# Patient Record
Sex: MALE | Race: WHITE | Employment: FULL TIME | URBAN - METROPOLITAN AREA
[De-identification: names, ages, dates, MRNs, and addresses within clinical notes are randomized per-mention and may not be internally consistent; named-entity substitution may affect disease eponyms.]

---

## 2018-03-01 ENCOUNTER — APPOINTMENT (OUTPATIENT)
Dept: MRI IMAGING | Age: 68
DRG: 069 | End: 2018-03-01
Attending: INTERNAL MEDICINE
Payer: COMMERCIAL

## 2018-03-01 ENCOUNTER — HOSPITAL ENCOUNTER (OUTPATIENT)
Age: 68
Setting detail: OBSERVATION
Discharge: HOME | DRG: 069 | End: 2018-03-03
Attending: EMERGENCY MEDICINE | Admitting: INTERNAL MEDICINE
Payer: COMMERCIAL

## 2018-03-01 ENCOUNTER — APPOINTMENT (OUTPATIENT)
Dept: CT IMAGING | Age: 68
DRG: 069 | End: 2018-03-01
Attending: EMERGENCY MEDICINE
Payer: COMMERCIAL

## 2018-03-01 DIAGNOSIS — G45.9 TRANSIENT CEREBRAL ISCHEMIA, UNSPECIFIED TYPE: Primary | ICD-10-CM

## 2018-03-01 DIAGNOSIS — I16.0 MALIGNANT HYPERTENSIVE URGENCY: ICD-10-CM

## 2018-03-01 PROBLEM — I16.1 HYPERTENSIVE EMERGENCY: Status: ACTIVE | Noted: 2018-03-01

## 2018-03-01 LAB
ALBUMIN SERPL-MCNC: 4.5 G/DL (ref 3.2–4.6)
ALBUMIN/GLOB SERPL: 1.3 {RATIO} (ref 1.2–3.5)
ALP SERPL-CCNC: 116 U/L (ref 50–136)
ALT SERPL-CCNC: 19 U/L (ref 12–65)
ANION GAP SERPL CALC-SCNC: 6 MMOL/L (ref 7–16)
AST SERPL-CCNC: 24 U/L (ref 15–37)
ATRIAL RATE: 71 BPM
BASOPHILS # BLD: 0 K/UL (ref 0–0.2)
BASOPHILS NFR BLD: 1 % (ref 0–2)
BILIRUB SERPL-MCNC: 0.7 MG/DL (ref 0.2–1.1)
BUN SERPL-MCNC: 18 MG/DL (ref 8–23)
CALCIUM SERPL-MCNC: 9.1 MG/DL (ref 8.3–10.4)
CALCULATED P AXIS, ECG09: 66 DEGREES
CALCULATED R AXIS, ECG10: 44 DEGREES
CALCULATED T AXIS, ECG11: 62 DEGREES
CHLORIDE SERPL-SCNC: 101 MMOL/L (ref 98–107)
CO2 SERPL-SCNC: 33 MMOL/L (ref 21–32)
CREAT SERPL-MCNC: 1.24 MG/DL (ref 0.8–1.5)
DIAGNOSIS, 93000: NORMAL
DIFFERENTIAL METHOD BLD: ABNORMAL
EOSINOPHIL # BLD: 0.2 K/UL (ref 0–0.8)
EOSINOPHIL NFR BLD: 3 % (ref 0.5–7.8)
ERYTHROCYTE [DISTWIDTH] IN BLOOD BY AUTOMATED COUNT: 13.3 % (ref 11.9–14.6)
GLOBULIN SER CALC-MCNC: 3.4 G/DL (ref 2.3–3.5)
GLUCOSE BLD STRIP.AUTO-MCNC: 128 MG/DL (ref 65–100)
GLUCOSE SERPL-MCNC: 102 MG/DL (ref 65–100)
HCT VFR BLD AUTO: 48.1 % (ref 41.1–50.3)
HGB BLD-MCNC: 17.3 G/DL (ref 13.6–17.2)
IMM GRANULOCYTES # BLD: 0 K/UL (ref 0–0.5)
IMM GRANULOCYTES NFR BLD AUTO: 0 % (ref 0–5)
INR BLD: 1 (ref 0.9–1.2)
LYMPHOCYTES # BLD: 1.7 K/UL (ref 0.5–4.6)
LYMPHOCYTES NFR BLD: 26 % (ref 13–44)
MCH RBC QN AUTO: 31.6 PG (ref 26.1–32.9)
MCHC RBC AUTO-ENTMCNC: 36 G/DL (ref 31.4–35)
MCV RBC AUTO: 87.9 FL (ref 79.6–97.8)
MONOCYTES # BLD: 0.8 K/UL (ref 0.1–1.3)
MONOCYTES NFR BLD: 12 % (ref 4–12)
NEUTS SEG # BLD: 3.7 K/UL (ref 1.7–8.2)
NEUTS SEG NFR BLD: 58 % (ref 43–78)
P-R INTERVAL, ECG05: 190 MS
PLATELET # BLD AUTO: 208 K/UL (ref 150–450)
PMV BLD AUTO: 9.9 FL (ref 10.8–14.1)
POTASSIUM SERPL-SCNC: 3.6 MMOL/L (ref 3.5–5.1)
PROT SERPL-MCNC: 7.9 G/DL (ref 6.3–8.2)
PT BLD: 12.3 SECS (ref 9.6–11.6)
Q-T INTERVAL, ECG07: 394 MS
QRS DURATION, ECG06: 96 MS
QTC CALCULATION (BEZET), ECG08: 428 MS
RBC # BLD AUTO: 5.47 M/UL (ref 4.23–5.67)
SODIUM SERPL-SCNC: 140 MMOL/L (ref 136–145)
VENTRICULAR RATE, ECG03: 71 BPM
WBC # BLD AUTO: 6.4 K/UL (ref 4.3–11.1)

## 2018-03-01 PROCEDURE — 96366 THER/PROPH/DIAG IV INF ADDON: CPT

## 2018-03-01 PROCEDURE — 80053 COMPREHEN METABOLIC PANEL: CPT | Performed by: EMERGENCY MEDICINE

## 2018-03-01 PROCEDURE — 74011250636 HC RX REV CODE- 250/636: Performed by: EMERGENCY MEDICINE

## 2018-03-01 PROCEDURE — 85610 PROTHROMBIN TIME: CPT

## 2018-03-01 PROCEDURE — 74011636320 HC RX REV CODE- 636/320: Performed by: EMERGENCY MEDICINE

## 2018-03-01 PROCEDURE — 65610000001 HC ROOM ICU GENERAL

## 2018-03-01 PROCEDURE — 82962 GLUCOSE BLOOD TEST: CPT

## 2018-03-01 PROCEDURE — 96365 THER/PROPH/DIAG IV INF INIT: CPT | Performed by: EMERGENCY MEDICINE

## 2018-03-01 PROCEDURE — 99218 HC RM OBSERVATION: CPT

## 2018-03-01 PROCEDURE — 70498 CT ANGIOGRAPHY NECK: CPT

## 2018-03-01 PROCEDURE — 93005 ELECTROCARDIOGRAM TRACING: CPT | Performed by: EMERGENCY MEDICINE

## 2018-03-01 PROCEDURE — 74011000250 HC RX REV CODE- 250: Performed by: INTERNAL MEDICINE

## 2018-03-01 PROCEDURE — 74011000258 HC RX REV CODE- 258: Performed by: EMERGENCY MEDICINE

## 2018-03-01 PROCEDURE — 99285 EMERGENCY DEPT VISIT HI MDM: CPT | Performed by: EMERGENCY MEDICINE

## 2018-03-01 PROCEDURE — 85025 COMPLETE CBC W/AUTO DIFF WBC: CPT | Performed by: EMERGENCY MEDICINE

## 2018-03-01 PROCEDURE — 74011000258 HC RX REV CODE- 258: Performed by: INTERNAL MEDICINE

## 2018-03-01 PROCEDURE — 74011250637 HC RX REV CODE- 250/637: Performed by: EMERGENCY MEDICINE

## 2018-03-01 PROCEDURE — 70551 MRI BRAIN STEM W/O DYE: CPT

## 2018-03-01 PROCEDURE — 74011250637 HC RX REV CODE- 250/637: Performed by: INTERNAL MEDICINE

## 2018-03-01 PROCEDURE — 70450 CT HEAD/BRAIN W/O DYE: CPT

## 2018-03-01 PROCEDURE — 96375 TX/PRO/DX INJ NEW DRUG ADDON: CPT | Performed by: EMERGENCY MEDICINE

## 2018-03-01 RX ORDER — HYDROCHLOROTHIAZIDE 25 MG/1
25 TABLET ORAL DAILY
Status: DISCONTINUED | OUTPATIENT
Start: 2018-03-01 | End: 2018-03-03 | Stop reason: HOSPADM

## 2018-03-01 RX ORDER — SODIUM CHLORIDE 0.9 % (FLUSH) 0.9 %
5-10 SYRINGE (ML) INJECTION EVERY 8 HOURS
Status: DISCONTINUED | OUTPATIENT
Start: 2018-03-01 | End: 2018-03-03 | Stop reason: HOSPADM

## 2018-03-01 RX ORDER — ASPIRIN 325 MG
325 TABLET ORAL
Status: COMPLETED | OUTPATIENT
Start: 2018-03-01 | End: 2018-03-01

## 2018-03-01 RX ORDER — LISINOPRIL 5 MG/1
10 TABLET ORAL DAILY
Status: DISCONTINUED | OUTPATIENT
Start: 2018-03-01 | End: 2018-03-02

## 2018-03-01 RX ORDER — SODIUM CHLORIDE 0.9 % (FLUSH) 0.9 %
10 SYRINGE (ML) INJECTION
Status: COMPLETED | OUTPATIENT
Start: 2018-03-01 | End: 2018-03-01

## 2018-03-01 RX ORDER — GUAIFENESIN 100 MG/5ML
81 LIQUID (ML) ORAL DAILY
Status: DISCONTINUED | OUTPATIENT
Start: 2018-03-02 | End: 2018-03-02

## 2018-03-01 RX ORDER — HYDRALAZINE HYDROCHLORIDE 20 MG/ML
10 INJECTION INTRAMUSCULAR; INTRAVENOUS
Status: COMPLETED | OUTPATIENT
Start: 2018-03-01 | End: 2018-03-01

## 2018-03-01 RX ORDER — ATORVASTATIN CALCIUM 40 MG/1
80 TABLET, FILM COATED ORAL
Status: DISCONTINUED | OUTPATIENT
Start: 2018-03-01 | End: 2018-03-03 | Stop reason: HOSPADM

## 2018-03-01 RX ORDER — SODIUM CHLORIDE 0.9 % (FLUSH) 0.9 %
5-10 SYRINGE (ML) INJECTION AS NEEDED
Status: DISCONTINUED | OUTPATIENT
Start: 2018-03-01 | End: 2018-03-03 | Stop reason: HOSPADM

## 2018-03-01 RX ADMIN — HYDRALAZINE HYDROCHLORIDE 10 MG: 20 INJECTION INTRAMUSCULAR; INTRAVENOUS at 13:59

## 2018-03-01 RX ADMIN — HYDROCHLOROTHIAZIDE 25 MG: 25 TABLET ORAL at 16:42

## 2018-03-01 RX ADMIN — Medication 10 ML: at 15:13

## 2018-03-01 RX ADMIN — ASPIRIN 325 MG ORAL TABLET 325 MG: 325 PILL ORAL at 13:49

## 2018-03-01 RX ADMIN — Medication 10 ML: at 21:33

## 2018-03-01 RX ADMIN — ATORVASTATIN CALCIUM 80 MG: 40 TABLET, FILM COATED ORAL at 21:33

## 2018-03-01 RX ADMIN — Medication 10 ML: at 18:46

## 2018-03-01 RX ADMIN — SODIUM CHLORIDE 100 ML: 900 INJECTION, SOLUTION INTRAVENOUS at 15:13

## 2018-03-01 RX ADMIN — IOPAMIDOL 80 ML: 755 INJECTION, SOLUTION INTRAVENOUS at 15:13

## 2018-03-01 RX ADMIN — LISINOPRIL 10 MG: 5 TABLET ORAL at 16:21

## 2018-03-01 RX ADMIN — SODIUM CHLORIDE 5 MG/HR: 900 INJECTION, SOLUTION INTRAVENOUS at 21:32

## 2018-03-01 RX ADMIN — SODIUM CHLORIDE 5 MG/HR: 900 INJECTION, SOLUTION INTRAVENOUS at 16:46

## 2018-03-01 NOTE — PROGRESS NOTES
TRANSFER - IN REPORT:    [facility] on [facility].  being received from ER (unit) for routine progression of care      Report consisted of patients Situation, Background, Assessment and   Recommendations(SBAR). Information from the following report(s) SBAR, Kardex, ED Summary, Intake/Output, MAR, Recent Results and Cardiac Rhythm NSR was reviewed with the receiving nurse. Opportunity for questions and clarification was provided. Assessment completed upon patients arrival to unit and care assumed.

## 2018-03-01 NOTE — ED TRIAGE NOTES
Pt states having left side weakness that started at 1147hrs and facial droop. Pt states about 95% of the symptoms have subsided. No headache.

## 2018-03-01 NOTE — Clinical Note
Status[de-identified] Inpatient [101] Type of Bed: Telemetry [19] Inpatient Hospitalization Certified Necessary for the Following Reasons: 3. Patient receiving treatment that can only be provided in an inpatient setting (further clarification in H&P documentation) Admitting Diagnosis: Hypertensive emergency [0163408] Admitting Diagnosis: TIA (transient ischemic attack) [120299] Admitting Physician: Jessa Plasencia [facility] [address] [[id number]] Attending Physician: Jessa Plasencia [facility] [address] [[id number]] Estimated Length of Stay: 2 Midnights Discharge Plan[de-identified] Home with Office Follow-up

## 2018-03-01 NOTE — IP AVS SNAPSHOT
Boris Foley 
 
 
 [address] [facility] [address] [facility] 
[phone] Patient: Blanche Todd. MRN: [id number] [facility]:[birth year] About your hospitalization You were admitted on:  March 1, 2018 You last received care in the:  [facility] 3 INTENSIVE CARE You were discharged on:  March 3, 2018 Why you were hospitalized Your primary diagnosis was:  Tia (Transient Ischemic Attack) Your diagnoses also included:  Hypertensive Emergency, Hx Of Sinus Bradycardia Follow-up Information Follow up With Details Comments Contact Info None   None ([phone]) Patient stated that they have no PCP Discharge Orders None A check federica indicates which time of day the medication should be taken. My Medications START taking these medications Instructions Each Dose to Equal  
 Morning Noon Evening Bedtime  
 atorvastatin 40 mg tablet Commonly known as:  LIPITOR Your last dose was:  3/2/2018 2121 Your next dose is:  3/3/2018 2100 Take 1 Tab by mouth nightly. 40 mg  
    
   
   
   
  
 clopidogrel 75 mg Tab Commonly known as:  PLAVIX Your last dose was:  N/A Your next dose is:  3/4/2018 0900 Notes to Patient:  Take for 21 days Take 1 Tab by mouth daily. 75 mg  
    
   
   
   
  
 hydroCHLOROthiazide 25 mg tablet Commonly known as:  HYDRODIURIL Your last dose was:  3/3/2018 7706 Your next dose is:  3/4/2018 0900 Notes to Patient:  May stop taking when BP is stable. Per MD Hodges Take 1 Tab by mouth daily. 25 mg  
    
   
   
   
  
 lisinopril 40 mg tablet Commonly known as:  Cydndom Joseph Your last dose was:  3/3/2018 5351 Your next dose is:  3/4/2018 0900 Take 1 Tab by mouth daily. 40 mg Where to Get Your Medications Information on where to get these meds will be given to you by the nurse or doctor. ! Ask your nurse or doctor about these medications  
  atorvastatin 40 mg tablet  
 clopidogrel 75 mg Tab  
 hydroCHLOROthiazide 25 mg tablet  
 lisinopril 40 mg tablet Discharge Instructions Acute High Blood Pressure: Care Instructions Your Care Instructions Acute high blood pressure is very high blood pressure. It's a serious problem. Very high blood pressure can damage your brain, heart, eyes, and kidneys. You may have been given medicines to lower your blood pressure. You may have gotten them as pills or through a needle in one of your veins. This is called an IV. And maybe you were given other medicines too. These can be needed when high blood pressure causes other problems. To keep your blood pressure at a lower level, you may need to make healthy lifestyle changes. And you will probably need to take medicines. Be sure to follow up with your doctor about your blood pressure and what you can do about it. Follow-up care is a key part of your treatment and safety. Be sure to make and go to all appointments, and call your doctor if you are having problems. It's also a good idea to know your test results and keep a list of the medicines you take. How can you care for yourself at home? · See your doctor as often as he or she recommends. This is to make sure your blood pressure is under control. You will probably go at least 2 times a year. But it may be more often at first. 
· Take your blood pressure medicine exactly as prescribed. You may take one or more types. They include diuretics, beta-blockers, ACE inhibitors, calcium channel blockers, and angiotensin II receptor blockers. Call your doctor if you think you are having a problem with your medicine. · If you take blood pressure medicine, talk to your doctor before you take decongestants or anti-inflammatory medicine, such as ibuprofen. These can raise blood pressure. · Learn how to check your blood pressure at home. Check it often. · Ask your doctor if it's okay to drink alcohol. · Talk to your doctor about lifestyle changes that can help blood pressure. These include being active and not smoking. When should you call for help? Call 911 anytime you think you may need emergency care. This may mean having symptoms that suggest that your blood pressure is causing a serious heart or blood vessel problem. Your blood pressure may be over 180/110. ? For example, call 911 if: 
? · You have symptoms of a heart attack. These may include: ¨ Chest pain or pressure, or a strange feeling in the chest. 
¨ Sweating. ¨ Shortness of breath. ¨ Nausea or vomiting. ¨ Pain, pressure, or a strange feeling in the back, neck, jaw, or upper belly or in one or both shoulders or arms. ¨ Lightheadedness or sudden weakness. ¨ A fast or irregular heartbeat. ? · You have symptoms of a stroke. These may include: 
¨ Sudden numbness, tingling, weakness, or loss of movement in your face, arm, or leg, especially on only one side of your body. ¨ Sudden vision changes. ¨ Sudden trouble speaking. ¨ Sudden confusion or trouble understanding simple statements. ¨ Sudden problems with walking or balance. ¨ A sudden, severe headache that is different from past headaches. ? · You have severe back or belly pain. ?Do not wait until your blood pressure comes down on its own. Get help right away. ?Call your doctor now or seek immediate care if: 
? · Your blood pressure is much higher than normal (such as 180/110 or higher), but you don't have symptoms. ? · You think high blood pressure is causing symptoms, such as: ¨ Severe headache. ¨ Blurry vision. ? Watch closely for changes in your health, and be sure to contact your doctor if: 
? · Your blood pressure measures 140/90 or higher at least 2 times. That means the top number is 140 or higher or the bottom number is 90 or higher, or both. ? · You think you may be having side effects from your blood pressure medicine. ? · Your blood pressure is usually normal, but it goes above normal at least 2 times. Where can you learn more? Go to http://theron-lesly.info/. Enter H724 in the search box to learn more about \"Acute High Blood Pressure: Care Instructions. \" Current as of: September 21, 2016 Content Version: 11.4 © 1669-5779 [employer]. Care instructions adapted under license by [employer] (which disclaims liability or warranty for this information). If you have questions about a medical condition or this instruction, always ask your healthcare professional. [address] any warranty or liability for your use of this information. Learning About Transient Ischemic Attack (TIA) What is a TIA? A transient ischemic attack (TIA) means that the blood flow to a part of the brain is blocked for a short time. A TIA feels like a stroke but usually lasts only 10 to 20 minutes. Unlike a stroke, a TIA does not cause lasting brain damage. A TIA is usually caused by a blood clot that blocks blood flow in the brain. A blood clot can form in another part of the body (often the heart) and travel through the bloodstream to the brain. When blood flow to part of the brain is blocked, the brain cells in that area are affected within seconds. This causes symptoms in parts of the body controlled by those brain cells. When the blood clot dissolves, blood flow returns, and the symptoms go away. Blood clots can be the result of hardening of the arteries (atherosclerosis) or a heart attack. Sometimes a TIA is caused by a sharp drop in blood pressure that reduces blood flow to the brain. This is called a \"low-flow\" TIA. It is not as common as a TIA caused by a blood clot. What happens after a TIA? TIA is a serious warning sign of a possible stroke in the future.  If you have other medical conditions such as coronary artery disease or atherosclerosis, you may also have an increased risk for a heart attack. Talk to your doctor about your risk. Understanding your risk will help you and your doctor plan your treatment options. You can do a lot to lower your chance of having another TIA or a stroke. Medicines can help, and you may also need to make lifestyle changes. What are the symptoms? Symptoms of a TIA are the same as symptoms of a stroke. But symptoms of a TIA don't last very long. Most of the time, they go away in 10 to 20 minutes. They may include: 
· Sudden numbness, tingling, weakness, or loss of movement in your face, arm, or leg, especially on only one side of your body. · Sudden vision changes. · Sudden trouble speaking. · Sudden confusion or trouble understanding simple statements. · Sudden problems with walking or balance. If you have any of these symptoms, call 911 or other emergency services right away. Ask your family, friends, and coworkers to learn the signs of a TIA. They may notice these signs before you do. Make sure they know to call 911 if these signs appear. How is a TIA treated? If you've had a TIA, you may need more testing and treatment after you get checked by your doctor. If you have a high risk of stroke, you may have to stay in the hospital for treatment. Your treatment for a TIA may include taking medicines to prevent blood clots or a stroke, or having surgery to reopen narrow arteries. How can you prevent another TIA? · Work with your doctor to treat any health problems you have. High blood pressure, high cholesterol, atrial fibrillation, and diabetes all raise your chances of having a stroke. · Be safe with medicines. Take your medicine exactly as prescribed. Call your doctor if you think you are having a problem with your medicine. · Have a healthy lifestyle. ¨ Do not smoke or allow others to smoke around you.  If you need help quitting, talk to your doctor about stop-smoking programs and medicines. These can increase your chances of quitting for good. Smoking makes a stroke more likely. ¨ Limit alcohol to 2 drinks a day for men and 1 drink a day for women. ¨ Lose weight if you need to. A healthy weight will help you keep your heart and body healthy. ¨ Be active. Ask your doctor what type and level of activity are safe for you. ¨ Eat heart-healthy foods, like fruits, vegetables, and high-fiber foods. Follow-up care is a key part of your treatment and safety. Be sure to make and go to all appointments, and call your doctor if you are having problems. It's also a good idea to know your test results and keep a list of the medicines you take. Where can you learn more? Go to http://theron-lesly.info/. Enter Q440 in the search box to learn more about \"Learning About Transient Ischemic Attack (TIA). \" 
Current as of: March 20, 2017 Content Version: 11.4 © 0899-9550 [email or web address]. Care instructions adapted under license by [employer] (which disclaims liability or warranty for this information). If you have questions about a medical condition or this instruction, always ask your healthcare professional. [address] any warranty or liability for your use of this information. DISCHARGE SUMMARY from Nurse PATIENT INSTRUCTIONS: 
 
 
 
What to do at Home: 
Recommended activity: Activity as tolerated, If you experience any of the following symptoms :  Return of similar symptoms of TIA or stroke, please go to the emergency room. *  Please give a list of your current medications to your Primary Care Provider. *  Please update this list whenever your medications are discontinued, doses are 
    changed, or new medications (including over-the-counter products) are added. *  Please carry medication information at all times in case of emergency situations. These are general instructions for a healthy lifestyle: No smoking/ No tobacco products/ Avoid exposure to second hand smoke Surgeon General's Warning:  Quitting smoking now greatly reduces serious risk to your health. Obesity, smoking, and sedentary lifestyle greatly increases your risk for illness A healthy diet, regular physical exercise & weight monitoring are important for maintaining a healthy lifestyle You may be retaining fluid if you have a history of heart failure or if you experience any of the following symptoms:  Weight gain of 3 pounds or more overnight or 5 pounds in a week, increased swelling in our hands or feet or shortness of breath while lying flat in bed. Please call your doctor as soon as you notice any of these symptoms; do not wait until your next office visit. Recognize signs and symptoms of STROKE: 
 
F-face looks uneven A-arms unable to move or move unevenly S-speech slurred or non-existent T-time-call 911 as soon as signs and symptoms begin-DO NOT go Back to bed or wait to see if you get better-TIME IS BRAIN. Warning Signs of HEART ATTACK Call 911 if you have these symptoms: 
? Chest discomfort. Most heart attacks involve discomfort in the center of the chest that lasts more than a few minutes, or that goes away and comes back. It can feel like uncomfortable pressure, squeezing, fullness, or pain. ? Discomfort in other areas of the upper body. Symptoms can include pain or discomfort in one or both arms, the back, neck, jaw, or stomach. ? Shortness of breath with or without chest discomfort. ? Other signs may include breaking out in a cold sweat, nausea, or lightheadedness. Don't wait more than five minutes to call [address]! Fast action can save your life. Calling 911 is almost always the fastest way to get lifesaving treatment.  Emergency Medical Services staff can begin treatment when they arrive  up to an hour sooner than if someone gets to the hospital by car. The discharge information has been reviewed with the patient. The patient verbalized understanding. Discharge medications reviewed with the patient and appropriate educational materials and side effects teaching were provided. ___________________________________________________________________________________________________________________________________ Introducing [facility]! [employer] introduces [employer] patient portal. Now you can access parts of your medical record, email your doctor's office, and request medication refills online. 1. In your internet browser, go to [email or web address] 2. Click on the First Time User? Click Here link in the Sign In box. You will see the New Member Sign Up page. 3. Enter your Alegr[employer]a Access Code exactly as it appears below. You will not need to use this code after youve completed the sign-up process. If you do not sign up before the expiration date, you must request a new code. · Alegr[employer] Access Code: [email or web address] Expires: 6/1/2018  9:27 AM 
 
4. Enter the last four digits of your Social Security Number (xxxx) and Date of Birth (mm/dd/yyyy) as indicated and click Submit. You will be taken to the next sign-up page. 5. Create a C2C REI Softwaret ID. This will be your Alegr[employer]a login ID and cannot be changed, so think of one that is secure and easy to remember. 6. Create a [employer] Softwaret password. You can change your password at any time. 7. Enter your Password Reset Question and Answer. This can be used at a later time if you forget your password. 8. Enter your e-mail address. You will receive e-mail notification when new information is available in [address]. 9. Click Sign Up. You can now view and download portions of your medical record. 10. Click the Download Summary menu link to download a portable copy of your medical information. If you have questions, please visit the Frequently Asked Questions section of the MyChart website. Remember, Today Tixt is NOT to be used for urgent needs. For medical emergencies, dial 911. Now available from your iPhone and Android! Providers Seen During Your Hospitalization Provider Specialty Primary office phone Cortez Treviño MD Emergency Medicine [phone] Joann Bailey MD Internal Medicine [phone] Your Primary Care Physician (PCP) Primary Care Physician Office Phone Office Fax NONE ** None ** ** None ** You are allergic to the following No active allergies Recent Documentation Height Weight BMI Smoking Status 1.702 m 72.5 kg 25.03 kg/m2 Never Assessed Emergency Contacts Name Discharge Info Relation Home Work Mobile Ortega Boyer  Spouse [3] [phone] Patient Belongings The following personal items are in your possession at time of discharge: 
  Dental Appliances: None  Visual Aid: Glasses, At bedside, With patient      Home Medications: None   Jewelry: Ring  Clothing: At bedside, Footwear, Pants, Shirt, Socks    Other Valuables: Cell Phone, Money (comment), [facility] Please provide this summary of care documentation to your next provider. Signatures-by signing, you are acknowledging that this After Visit Summary has been reviewed with you and you have received a copy. Patient Signature:  ____________________________________________________________ Date:  ____________________________________________________________  
  
Jermaine Simon Provider Signature:  ____________________________________________________________ Date:  ____________________________________________________________

## 2018-03-01 NOTE — H&P
Hospitalist H&P/Consult Note     Admit Date:  3/1/2018  1:27 PM   Name:  Corey Jeff. Age:  78 y.o.  :  1950   MRN:  [id number]   PCP:  None  Treatment Team: Attending Provider: Bright Helm MD; Primary Nurse: Opal King RN    HPI:   78years old M with PMH of ? HTN, previous R eye surgery presented to the ED after having L side weakness episode. Patient stated after getting into his car today he noticed having LLE heaviness. While driving around noon patient developed LUE heaviness and L facial droop. Patient was concern about stroke and drove to the hospital. Patient stated after arriving to parking lot symptoms resolved. Patient stated this is the first time having the symptoms. 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 Patient stated in the past his BP has been high but upon rechecking BP it will be on normal ranges. Patient is not taking any BP meds. Patient stated is usually very healthy. Patient denies history of previous stroke, HTN. Heart disease or DM. In the ED BP was 230/ 112 and CT brain did not show any acute disease. Tel neurology impression was TIA and recommended to keep BP below 130/80.     10 systems reviewed and negative except as noted in HPI. History reviewed. No pertinent past medical history. Past Surgical History:   Procedure Laterality Date    HX GI      HX ORTHOPAEDIC        None     No Known Allergies   Social History   Substance Use Topics    Smoking status: Not on file    Smokeless tobacco: Not on file    Alcohol use Not on file      History reviewed. No pertinent family history. There is no immunization history on file for this patient.     Objective:     Patient Vitals for the past 24 hrs:   Temp Pulse Resp BP SpO2   18 1433 - (!) 59 11 183/90 97 %   18 1359 - 61 - (!) 195/100 -   18 1348 - 68 - (!) 195/100 94 %   18 1342 - 69 - (!) 230/112 97 %   18 1315 97.9 °F (36.6 °C) 84 18 (!) 224/121 94 %     Oxygen Therapy  O2 Sat (%): 97 % (03/01/18 1433)  Pulse via Oximetry: 60 beats per minute (03/01/18 1433)  O2 Device: Room air (03/01/18 1315)  No intake or output data in the 24 hours ending 03/01/18 1604    Physical Exam:  General:    Well nourished. Alert. Eyes:   Normal sclera. Extraocular movements intact. ENT:  Normocephalic, atraumatic. Moist mucous membranes  CV:   RRR. No murmur, rub, or gallop. Lungs:  CTAB. No wheezing, rhonchi, or rales. Abdomen: Soft, nontender, nondistended. Bowel sounds normal.   Extremities: Warm and dry. No cyanosis or edema. Neurologic: CN II-XII intact. Sensation intact. Upper and lower extremities 5/5 strength. No drifts. Skin:     No rashes or jaundice. No wounds. Psych:  Normal mood and affect. I reviewed the labs, imaging, EKGs, telemetry, and other studies done this admission. Data Review:   Recent Results (from the past 24 hour(s))   GLUCOSE, POC    Collection Time: 03/01/18  1:19 PM   Result Value Ref Range    Glucose (POC) 128 (H) 65 - 100 mg/dL   POC PT/INR    Collection Time: 03/01/18  1:22 PM   Result Value Ref Range    Prothrombin time (POC) 12.3 (H) 9.6 - 11.6 SECS    INR (POC) 1.0 0.9 - 1.2     CBC WITH AUTOMATED DIFF    Collection Time: 03/01/18  1:23 PM   Result Value Ref Range    WBC 6.4 4.3 - 11.1 K/uL    RBC 5.47 4.23 - 5.67 M/uL    HGB 17.3 (H) 13.6 - 17.2 g/dL    HCT 48.1 41.1 - 50.3 %    MCV 87.9 79.6 - 97.8 FL    MCH 31.6 26.1 - 32.9 PG    MCHC 36.0 (H) 31.4 - 35.0 g/dL    RDW 13.3 11.9 - 14.6 %    PLATELET 607 705 - 063 K/uL    MPV 9.9 (L) 10.8 - 14.1 FL    DF AUTOMATED      NEUTROPHILS 58 43 - 78 %    LYMPHOCYTES 26 13 - 44 %    MONOCYTES 12 4.0 - 12.0 %    EOSINOPHILS 3 0.5 - 7.8 %    BASOPHILS 1 0.0 - 2.0 %    IMMATURE GRANULOCYTES 0 0.0 - 5.0 %    ABS. NEUTROPHILS 3.7 1.7 - 8.2 K/UL    ABS. LYMPHOCYTES 1.7 0.5 - 4.6 K/UL    ABS. MONOCYTES 0.8 0.1 - 1.3 K/UL    ABS. EOSINOPHILS 0.2 0.0 - 0.8 K/UL    ABS. BASOPHILS 0.0 0.0 - 0.2 K/UL    ABS. IMM. GRANS. 0.0 0.0 - 0.5 K/UL   METABOLIC PANEL, COMPREHENSIVE    Collection Time: 03/01/18  1:23 PM   Result Value Ref Range    Sodium 140 136 - 145 mmol/L    Potassium 3.6 3.5 - 5.1 mmol/L    Chloride 101 98 - 107 mmol/L    CO2 33 (H) 21 - 32 mmol/L    Anion gap 6 (L) 7 - 16 mmol/L    Glucose 102 (H) 65 - 100 mg/dL    BUN 18 8 - 23 MG/DL    Creatinine 1.24 0.8 - 1.5 MG/DL    GFR est AA >60 >60 ml/min/1.73m2    GFR est non-AA >60 >60 ml/min/1.73m2    Calcium 9.1 8.3 - 10.4 MG/DL    Bilirubin, total 0.7 0.2 - 1.1 MG/DL    ALT (SGPT) 19 12 - 65 U/L    AST (SGOT) 24 15 - 37 U/L    Alk. phosphatase 116 50 - 136 U/L    Protein, total 7.9 6.3 - 8.2 g/dL    Albumin 4.5 3.2 - 4.6 g/dL    Globulin 3.4 2.3 - 3.5 g/dL    A-G Ratio 1.3 1.2 - 3.5     EKG, 12 LEAD, INITIAL    Collection Time: 03/01/18  1:35 PM   Result Value Ref Range    Ventricular Rate 71 BPM    Atrial Rate 71 BPM    P-R Interval 190 ms    QRS Duration 96 ms    Q-T Interval 394 ms    QTC Calculation (Bezet) 428 ms    Calculated P Axis 66 degrees    Calculated R Axis 44 degrees    Calculated T Axis 62 degrees    Diagnosis       !! AGE AND GENDER SPECIFIC ECG ANALYSIS !! Sinus rhythm with Premature atrial complexes  Abnormal ECG  No previous ECGs available  Confirmed by [facility][1]  MD ()NATHAN ([id number]) on 3/1/2018 2:44:51 PM         Imaging Studies:  CXR Results  (Last 48 hours)    None        CT Results  (Last 48 hours)               03/01/18 1329  CT HEAD WO CONT Final result    Impression:  IMPRESSION: No acute intracranial abnormality. 2. Microvascular ischemia.                        Narrative:  CT Head without contrast        Indication: Left-sided weakness       Comparison:  None       Procedure: 5 mm axial images were obtained from skull base to vertex without   contrast. Radiation dose reduction techniques were used for this study:  Our CT   scanners use one or all of the following: Automated exposure control, adjustment   of the mA and/or kVp according to patient's size, iterative reconstruction. Findings: The ventricular size and configuration is normal given age-appropriate   diffuse cortical atrophy. Ventricles, sulci, and cisterns are normal in size. No midline shift. No mass, mass-effect, hemorrhage, or other focal abnormality    in the brain. No extra-axial abnormality white matter hypodensities compatible   with microvascular ischemia. No acute cortical based infarct. Mastoid air cells   and included paranasal sinuses are clear. The calvarium is intact. Assessment and Plan:     Hospital Problems as of 3/1/2018  Never Reviewed          Codes Class Noted - Resolved POA    * (Principal)TIA (transient ischemic attack) ICD-10-CM: G45.9  ICD-9-CM: 435.9  3/1/2018 - Present Unknown        Hypertensive emergency ICD-10-CM: I16.1  ICD-9-CM: 401.9  3/1/2018 - Present Unknown              A/P:  -TIA   Neurological symptoms resolved  Likely due to hypertensive emergency  CT brain as above    Plan  Get MRI, CTA head/neck  Echocardiogram and monitor in telemetry  Keep BP below 130/80  PT/OT eval  Cont ASA and add high intensity statins    -Hypertensive emergency  Start cardizem drip to keep BP below 130/80  Will add lisinopril and hctz  Titrate drip down     DVT ppx: no needed as patient is ambulatory. Low risk  Code: full code      Estimated LOS: 1-2 nights     Signed:   Govind West MD

## 2018-03-01 NOTE — ED NOTES
TRANSFER - OUT REPORT:    Verbal report given to [address] [place] [address], RN(name) on [facility].  being transferred to ICU(unit) for routine progression of care       Report consisted of patients Situation, Background, Assessment and   Recommendations(SBAR). Information from the following report(s) ED Summary was reviewed with the receiving nurse. Lines:   Peripheral IV 03/01/18 Right Antecubital (Active)       Peripheral IV 03/01/18 Left Antecubital (Active)   Site Assessment Clean, dry, & intact 3/1/2018  1:39 PM   Phlebitis Assessment 0 3/1/2018  1:39 PM   Infiltration Assessment 0 3/1/2018  1:39 PM   Dressing Status Clean, dry, & intact 3/1/2018  1:39 PM   Dressing Type Transparent 3/1/2018  1:39 PM        Opportunity for questions and clarification was provided.       Patient transported with:   Monitor, this RN, IV infusions, pt chart, belongings

## 2018-03-01 NOTE — IP AVS SNAPSHOT
Summary of Care Report The Summary of Care report has been created to help improve care coordination. Users with access to [employer] or [employer] [address] (Web-based application) may access additional patient information including the Discharge Summary. If you are not currently a [address] user and need more information, please call the number listed below in the [place] [address] section and ask to be connected with Medical Records. Facility Information Name Address Phone [phone] [address] [address] [phone] Patient Information Patient Name Sex  Oswald Cruz ([id number]) Male 1950 Discharge Information Admitting Provider Service Area Unit Jennifer Lay MD / [address] Intensive Care / [phone] Discharge Provider Discharge Date/Time Discharge Disposition Destination (none) 3/3/2018 Morning (Pending) AHR (none) Patient Language Language ENGLISH [13] Hospital Problems as of 3/3/2018  Never Reviewed Class Noted - Resolved Last Modified POA Active Problems * (Principal)TIA (transient ischemic attack)  3/1/2018 - Present 3/2/2018 by Yolanda Mendoza NP Yes Entered by Jennifer Lay MD  
  Hypertensive emergency  3/1/2018 - Present 3/2/2018 by Yolanda Mendoza NP Yes Entered by Jennifer Lay MD  
  Hx of sinus bradycardia  3/2/2018 - Present 3/2/2018 by Rohith Hull MD Yes Entered by Rohith Hull MD  
  Overview Signed 3/2/2018  5:12 PM by Rohith Hull MD  
   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 Current Assessment & Plan 3/1/2018 Hospital Encounter Deleted 3/2/2018  5:11 PM by Rohith Hull MD  
   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 You are allergic to the following No active allergies Current Discharge Medication List  
  
START taking these medications Dose & Instructions Dispensing Information Comments  
 atorvastatin 40 mg tablet Commonly known as:  LIPITOR Dose:  40 mg Take 1 Tab by mouth nightly. Quantity:  30 Tab Refills:  1  
   
 clopidogrel 75 mg Tab Commonly known as:  PLAVIX Notes to Patient:  Take for 21 days Dose:  75 mg Take 1 Tab by mouth daily. Quantity:  21 Tab Refills:  0  
   
 hydroCHLOROthiazide 25 mg tablet Commonly known as:  HYDRODIURIL Notes to Patient:  May stop taking when BP is stable. Per MD Aqueel Dose:  25 mg Take 1 Tab by mouth daily. Quantity:  30 Tab Refills:  1  
   
 lisinopril 40 mg tablet Commonly known as:  Leonardo [place] Dose:  40 mg Take 1 Tab by mouth daily. Quantity:  30 Tab Refills:  1 Follow-up Information Follow up With Details Comments Contact Info None   None (395) Patient stated that they have no PCP Discharge Instructions Acute High Blood Pressure: Care Instructions Your Care Instructions Acute high blood pressure is very high blood pressure. It's a serious problem. Very high blood pressure can damage your brain, heart, eyes, and kidneys. You may have been given medicines to lower your blood pressure. You may have gotten them as pills or through a needle in one of your veins. This is called an IV. And maybe you were given other medicines too. These can be needed when high blood pressure causes other problems. To keep your blood pressure at a lower level, you may need to make healthy lifestyle changes. And you will probably need to take medicines. Be sure to follow up with your doctor about your blood pressure and what you can do about it. Follow-up care is a key part of your treatment and safety.  Be sure to make and go to all appointments, and call your doctor if you are having problems. It's also a good idea to know your test results and keep a list of the medicines you take. How can you care for yourself at home? · See your doctor as often as he or she recommends. This is to make sure your blood pressure is under control. You will probably go at least 2 times a year. But it may be more often at first. 
· Take your blood pressure medicine exactly as prescribed. You may take one or more types. They include diuretics, beta-blockers, ACE inhibitors, calcium channel blockers, and angiotensin II receptor blockers. Call your doctor if you think you are having a problem with your medicine. · If you take blood pressure medicine, talk to your doctor before you take decongestants or anti-inflammatory medicine, such as ibuprofen. These can raise blood pressure. · Learn how to check your blood pressure at home. Check it often. · Ask your doctor if it's okay to drink alcohol. · Talk to your doctor about lifestyle changes that can help blood pressure. These include being active and not smoking. When should you call for help? Call 911 anytime you think you may need emergency care. This may mean having symptoms that suggest that your blood pressure is causing a serious heart or blood vessel problem. Your blood pressure may be over 180/110. ? For example, call 911 if: 
? · You have symptoms of a heart attack. These may include: ¨ Chest pain or pressure, or a strange feeling in the chest. 
¨ Sweating. ¨ Shortness of breath. ¨ Nausea or vomiting. ¨ Pain, pressure, or a strange feeling in the back, neck, jaw, or upper belly or in one or both shoulders or arms. ¨ Lightheadedness or sudden weakness. ¨ A fast or irregular heartbeat. ? · You have symptoms of a stroke. These may include: 
¨ Sudden numbness, tingling, weakness, or loss of movement in your face, arm, or leg, especially on only one side of your body. ¨ Sudden vision changes. ¨ Sudden trouble speaking. ¨ Sudden confusion or trouble understanding simple statements. ¨ Sudden problems with walking or balance. ¨ A sudden, severe headache that is different from past headaches. ? · You have severe back or belly pain. ?Do not wait until your blood pressure comes down on its own. Get help right away. ?Call your doctor now or seek immediate care if: 
? · Your blood pressure is much higher than normal (such as 180/110 or higher), but you don't have symptoms. ? · You think high blood pressure is causing symptoms, such as: ¨ Severe headache. ¨ Blurry vision. ? Watch closely for changes in your health, and be sure to contact your doctor if: 
? · Your blood pressure measures 140/90 or higher at least 2 times. That means the top number is 140 or higher or the bottom number is 90 or higher, or both. ? · You think you may be having side effects from your blood pressure medicine. ? · Your blood pressure is usually normal, but it goes above normal at least 2 times. Where can you learn more? Go to http://hteron-lesly.info/. Enter R521 in the search box to learn more about \"Acute High Blood Pressure: Care Instructions. \" Current as of: September 21, 2016 Content Version: 11.4 © 5798-2485 [employer]. Care instructions adapted under license by [employer] (which disclaims liability or warranty for this information). If you have questions about a medical condition or this instruction, always ask your healthcare professional. [place] [address] any warranty or liability for your use of this information. Learning About Transient Ischemic Attack (TIA) What is a TIA? A transient ischemic attack (TIA) means that the blood flow to a part of the brain is blocked for a short time. A TIA feels like a stroke but usually lasts only 10 to 20 minutes.  Unlike a stroke, a TIA does not cause lasting brain damage. A TIA is usually caused by a blood clot that blocks blood flow in the brain. A blood clot can form in another part of the body (often the heart) and travel through the bloodstream to the brain. When blood flow to part of the brain is blocked, the brain cells in that area are affected within seconds. This causes symptoms in parts of the body controlled by those brain cells. When the blood clot dissolves, blood flow returns, and the symptoms go away. Blood clots can be the result of hardening of the arteries (atherosclerosis) or a heart attack. Sometimes a TIA is caused by a sharp drop in blood pressure that reduces blood flow to the brain. This is called a \"low-flow\" TIA. It is not as common as a TIA caused by a blood clot. What happens after a TIA? TIA is a serious warning sign of a possible stroke in the future. If you have other medical conditions such as coronary artery disease or atherosclerosis, you may also have an increased risk for a heart attack. Talk to your doctor about your risk. Understanding your risk will help you and your doctor plan your treatment options. You can do a lot to lower your chance of having another TIA or a stroke. Medicines can help, and you may also need to make lifestyle changes. What are the symptoms? Symptoms of a TIA are the same as symptoms of a stroke. But symptoms of a TIA don't last very long. Most of the time, they go away in 10 to 20 minutes. They may include: 
· Sudden numbness, tingling, weakness, or loss of movement in your face, arm, or leg, especially on only one side of your body. · Sudden vision changes. · Sudden trouble speaking. · Sudden confusion or trouble understanding simple statements. · Sudden problems with walking or balance. If you have any of these symptoms, call 911 or other emergency services right away. Ask your family, friends, and coworkers to learn the signs of a TIA.  They may notice these signs before you do. Make sure they know to call 911 if these signs appear. How is a TIA treated? If you've had a TIA, you may need more testing and treatment after you get checked by your doctor. If you have a high risk of stroke, you may have to stay in the hospital for treatment. Your treatment for a TIA may include taking medicines to prevent blood clots or a stroke, or having surgery to reopen narrow arteries. How can you prevent another TIA? · Work with your doctor to treat any health problems you have. High blood pressure, high cholesterol, atrial fibrillation, and diabetes all raise your chances of having a stroke. · Be safe with medicines. Take your medicine exactly as prescribed. Call your doctor if you think you are having a problem with your medicine. · Have a healthy lifestyle. ¨ Do not smoke or allow others to smoke around you. If you need help quitting, talk to your doctor about stop-smoking programs and medicines. These can increase your chances of quitting for good. Smoking makes a stroke more likely. ¨ Limit alcohol to 2 drinks a day for men and 1 drink a day for women. ¨ Lose weight if you need to. A healthy weight will help you keep your heart and body healthy. ¨ Be active. Ask your doctor what type and level of activity are safe for you. ¨ Eat heart-healthy foods, like fruits, vegetables, and high-fiber foods. Follow-up care is a key part of your treatment and safety. Be sure to make and go to all appointments, and call your doctor if you are having problems. It's also a good idea to know your test results and keep a list of the medicines you take. Where can you learn more? Go to http://theron-lesly.info/. Enter H980 in the search box to learn more about \"Learning About Transient Ischemic Attack (TIA). \" 
Current as of: March 20, 2017 Content Version: 11.4 © 2057-9220 [employer].  Care instructions adapted under license by [address] (which disclaims liability or warranty for this information). If you have questions about a medical condition or this instruction, always ask your healthcare professional. [address] any warranty or liability for your use of this information. DISCHARGE SUMMARY from Nurse PATIENT INSTRUCTIONS: 
 
 
 
What to do at Home: 
Recommended activity: Activity as tolerated, If you experience any of the following symptoms :  Return of similar symptoms of TIA or stroke, please go to the emergency room. *  Please give a list of your current medications to your Primary Care Provider. *  Please update this list whenever your medications are discontinued, doses are 
    changed, or new medications (including over-the-counter products) are added. *  Please carry medication information at all times in case of emergency situations. These are general instructions for a healthy lifestyle: No smoking/ No tobacco products/ Avoid exposure to second hand smoke Surgeon General's Warning:  Quitting smoking now greatly reduces serious risk to your health. Obesity, smoking, and sedentary lifestyle greatly increases your risk for illness A healthy diet, regular physical exercise & weight monitoring are important for maintaining a healthy lifestyle You may be retaining fluid if you have a history of heart failure or if you experience any of the following symptoms:  Weight gain of 3 pounds or more overnight or 5 pounds in a week, increased swelling in our hands or feet or shortness of breath while lying flat in bed. Please call your doctor as soon as you notice any of these symptoms; do not wait until your next office visit. Recognize signs and symptoms of STROKE: 
 
F-face looks uneven A-arms unable to move or move unevenly S-speech slurred or non-existent T-time-call 911 as soon as signs and symptoms begin-DO NOT go  
 Back to bed or wait to see if you get better-TIME IS BRAIN. Warning Signs of HEART ATTACK Call 911 if you have these symptoms: 
? Chest discomfort. Most heart attacks involve discomfort in the center of the chest that lasts more than a few minutes, or that goes away and comes back. It can feel like uncomfortable pressure, squeezing, fullness, or pain. ? Discomfort in other areas of the upper body. Symptoms can include pain or discomfort in one or both arms, the back, neck, jaw, or stomach. ? Shortness of breath with or without chest discomfort. ? Other signs may include breaking out in a cold sweat, nausea, or lightheadedness. Don't wait more than five minutes to call [address]! Fast action can save your life. Calling 911 is almost always the fastest way to get lifesaving treatment. Emergency Medical Services staff can begin treatment when they arrive  up to an hour sooner than if someone gets to the hospital by car. The discharge information has been reviewed with the patient. The patient verbalized understanding. Discharge medications reviewed with the patient and appropriate educational materials and side effects teaching were provided. ___________________________________________________________________________________________________________________________________ Chart Review Routing History No Routing History on File

## 2018-03-01 NOTE — ED PROVIDER NOTES
HPI Comments: ppatient states he was getting into his car today at approximately 11:47 AM and noted his left leg would not work appropriately, followed by some left arm weakness and partially 4-5 minutes later some left facial droop. Patient drove himself to the emergency department, stating at 95% of the symptoms and now dissipated, still noting a little bit of numbness to his left lower lip, slight left facial droop and possibly a little difficulty using his left side. Patient is a 78 y.o. male presenting with weakness. The history is provided by the patient. Extremity Weakness    This is a new problem. The current episode started 1 to 2 hours ago. The problem occurs rarely. The problem has been rapidly improving. The patient is experiencing no pain. Associated symptoms include numbness (slight to the left lower lip). Pertinent negatives include full range of motion, no stiffness, no tingling, no itching, no back pain and no neck pain. He has tried nothing for the symptoms. The treatment provided significant relief. There has been no history of extremity trauma. History reviewed. No pertinent past medical history. Past Surgical History:   Procedure Laterality Date    HX GI      HX ORTHOPAEDIC           History reviewed. No pertinent family history. Social History     Social History    Marital status:      Spouse name: N/A    Number of children: N/A    Years of education: N/A     Occupational History    Not on file. Social History Main Topics    Smoking status: Not on file    Smokeless tobacco: Not on file    Alcohol use Not on file    Drug use: Not on file    Sexual activity: Not on file     Other Topics Concern    Not on file     Social History Narrative    No narrative on file         ALLERGIES: Review of patient's allergies indicates no known allergies. Review of Systems   Constitutional: Negative for chills and fever.    Musculoskeletal: Negative for back pain, neck pain and stiffness. Skin: Negative for itching. Neurological: Positive for facial asymmetry, weakness and numbness (slight to the left lower lip). Negative for tingling, tremors, syncope, speech difficulty, light-headedness and headaches. All other systems reviewed and are negative. Vitals:    03/01/18 1315   BP: (!) 224/121   Pulse: 84   Resp: 18   Temp: 97.9 °F (36.6 °C)   SpO2: 94%   Weight: 74.8 kg (165 lb)   Height: 5' 7.5\" (1.715 m)            Physical Exam   Constitutional: He is oriented to person, place, and time. He appears well-developed and well-nourished. No distress. HENT:   Head: Normocephalic and atraumatic. Right Ear: Tympanic membrane and external ear normal.   Left Ear: Tympanic membrane and external ear normal.   Mouth/Throat: Oropharynx is clear and moist.   Eyes: Conjunctivae and EOM are normal. Pupils are equal, round, and reactive to light. Neck: Normal range of motion. Neck supple. No tracheal deviation present. Cardiovascular: Normal rate, regular rhythm, normal heart sounds and intact distal pulses. Exam reveals no gallop and no friction rub. No murmur heard. Pulmonary/Chest: Effort normal and breath sounds normal. No respiratory distress. He has no wheezes. Abdominal: Soft. Bowel sounds are normal. He exhibits no distension and no mass. There is no hepatosplenomegaly. There is no tenderness. There is no rebound and no guarding. Musculoskeletal: Normal range of motion. He exhibits no edema. Lymphadenopathy:     He has no cervical adenopathy. Neurological: He is alert and oriented to person, place, and time. A cranial nerve deficit (minimal left facial droop noted does not include the forehead) is present. No sensory deficit. Coordination and gait normal.   Reflex Scores:       Patellar reflexes are 2+ on the right side and 1+ on the left side.   Strength 5/5 bilaterally, with question of slight decreased  strength on the left compared to the right, and mildly diminished DTR left lower extremity. Skin: Skin is warm and dry. No rash noted. He is not diaphoretic. No erythema. Psychiatric: He has a normal mood and affect. His speech is normal.   Nursing note and vitals reviewed. MDM      ED Course       Procedures      The patient was observed in the ED. On the evaluation of the neurologist via tele-neurology, the patient's symptoms had resolved and was therefore not a candidate for TPA. They were very concerned about his elevated blood pressure and requested immediate treatment  With either hydralazine or labetalol, as well as an evaluation with a CTA of the head and neck. Results Reviewed:      Recent Results (from the past 24 hour(s))   GLUCOSE, POC    Collection Time: 03/01/18  1:19 PM   Result Value Ref Range    Glucose (POC) 128 (H) 65 - 100 mg/dL   POC PT/INR    Collection Time: 03/01/18  1:22 PM   Result Value Ref Range    Prothrombin time (POC) 12.3 (H) 9.6 - 11.6 SECS    INR (POC) 1.0 0.9 - 1.2     CBC WITH AUTOMATED DIFF    Collection Time: 03/01/18  1:23 PM   Result Value Ref Range    WBC 6.4 4.3 - 11.1 K/uL    RBC 5.47 4.23 - 5.67 M/uL    HGB 17.3 (H) 13.6 - 17.2 g/dL    HCT 48.1 41.1 - 50.3 %    MCV 87.9 79.6 - 97.8 FL    MCH 31.6 26.1 - 32.9 PG    MCHC 36.0 (H) 31.4 - 35.0 g/dL    RDW 13.3 11.9 - 14.6 %    PLATELET 974 594 - 008 K/uL    MPV 9.9 (L) 10.8 - 14.1 FL    DF AUTOMATED      NEUTROPHILS 58 43 - 78 %    LYMPHOCYTES 26 13 - 44 %    MONOCYTES 12 4.0 - 12.0 %    EOSINOPHILS 3 0.5 - 7.8 %    BASOPHILS 1 0.0 - 2.0 %    IMMATURE GRANULOCYTES 0 0.0 - 5.0 %    ABS. NEUTROPHILS 3.7 1.7 - 8.2 K/UL    ABS. LYMPHOCYTES 1.7 0.5 - 4.6 K/UL    ABS. MONOCYTES 0.8 0.1 - 1.3 K/UL    ABS. EOSINOPHILS 0.2 0.0 - 0.8 K/UL    ABS. BASOPHILS 0.0 0.0 - 0.2 K/UL    ABS. IMM. GRANS. 0.0 0.0 - 0.5 K/UL       CT HEAD WO CONT   Final Result   IMPRESSION: No acute intracranial abnormality. 2. Microvascular ischemia.                   CTA HEAD NECK W WO CONT (Results Pending)

## 2018-03-02 PROBLEM — Z86.79 HX OF SINUS BRADYCARDIA: Status: ACTIVE | Noted: 2018-03-02

## 2018-03-02 LAB
CHOLEST SERPL-MCNC: 172 MG/DL
EST. AVERAGE GLUCOSE BLD GHB EST-MCNC: NORMAL MG/DL
HBA1C MFR BLD: 4.9 % (ref 4.8–6)
HDLC SERPL-MCNC: 56 MG/DL (ref 40–60)
HDLC SERPL: 3.1 {RATIO}
LDLC SERPL CALC-MCNC: 99.2 MG/DL
LIPID PROFILE,FLP: NORMAL
MAGNESIUM SERPL-MCNC: 2.3 MG/DL (ref 1.8–2.4)
TRIGL SERPL-MCNC: 84 MG/DL (ref 35–150)
VLDLC SERPL CALC-MCNC: 16.8 MG/DL (ref 6–23)

## 2018-03-02 PROCEDURE — 74011250637 HC RX REV CODE- 250/637: Performed by: INTERNAL MEDICINE

## 2018-03-02 PROCEDURE — 93306 TTE W/DOPPLER COMPLETE: CPT

## 2018-03-02 PROCEDURE — G8978 MOBILITY CURRENT STATUS: HCPCS

## 2018-03-02 PROCEDURE — 83036 HEMOGLOBIN GLYCOSYLATED A1C: CPT | Performed by: INTERNAL MEDICINE

## 2018-03-02 PROCEDURE — 80061 LIPID PANEL: CPT | Performed by: INTERNAL MEDICINE

## 2018-03-02 PROCEDURE — 65660000000 HC RM CCU STEPDOWN

## 2018-03-02 PROCEDURE — 97161 PT EVAL LOW COMPLEX 20 MIN: CPT

## 2018-03-02 PROCEDURE — 74011250637 HC RX REV CODE- 250/637: Performed by: PSYCHIATRY & NEUROLOGY

## 2018-03-02 PROCEDURE — 99218 HC RM OBSERVATION: CPT

## 2018-03-02 PROCEDURE — G8979 MOBILITY GOAL STATUS: HCPCS

## 2018-03-02 PROCEDURE — 97165 OT EVAL LOW COMPLEX 30 MIN: CPT

## 2018-03-02 PROCEDURE — G8980 MOBILITY D/C STATUS: HCPCS

## 2018-03-02 PROCEDURE — 83735 ASSAY OF MAGNESIUM: CPT | Performed by: INTERNAL MEDICINE

## 2018-03-02 PROCEDURE — 92610 EVALUATE SWALLOWING FUNCTION: CPT

## 2018-03-02 PROCEDURE — 36415 COLL VENOUS BLD VENIPUNCTURE: CPT | Performed by: INTERNAL MEDICINE

## 2018-03-02 RX ORDER — LISINOPRIL 5 MG/1
10 TABLET ORAL ONCE
Status: COMPLETED | OUTPATIENT
Start: 2018-03-02 | End: 2018-03-02

## 2018-03-02 RX ORDER — ASPIRIN 325 MG
325 TABLET ORAL DAILY
Status: DISCONTINUED | OUTPATIENT
Start: 2018-03-02 | End: 2018-03-03 | Stop reason: HOSPADM

## 2018-03-02 RX ORDER — LISINOPRIL 5 MG/1
10 TABLET ORAL
Status: COMPLETED | OUTPATIENT
Start: 2018-03-02 | End: 2018-03-02

## 2018-03-02 RX ORDER — LISINOPRIL 20 MG/1
20 TABLET ORAL DAILY
Status: DISCONTINUED | OUTPATIENT
Start: 2018-03-03 | End: 2018-03-02

## 2018-03-02 RX ORDER — HYDRALAZINE HYDROCHLORIDE 10 MG/1
10 TABLET, FILM COATED ORAL ONCE
Status: COMPLETED | OUTPATIENT
Start: 2018-03-02 | End: 2018-03-02

## 2018-03-02 RX ADMIN — Medication 10 ML: at 05:47

## 2018-03-02 RX ADMIN — LISINOPRIL 10 MG: 5 TABLET ORAL at 08:49

## 2018-03-02 RX ADMIN — ASPIRIN 325 MG ORAL TABLET 325 MG: 325 PILL ORAL at 10:00

## 2018-03-02 RX ADMIN — Medication 10 ML: at 15:06

## 2018-03-02 RX ADMIN — ATORVASTATIN CALCIUM 80 MG: 40 TABLET, FILM COATED ORAL at 21:21

## 2018-03-02 RX ADMIN — Medication 10 ML: at 21:22

## 2018-03-02 RX ADMIN — HYDROCHLOROTHIAZIDE 25 MG: 25 TABLET ORAL at 08:49

## 2018-03-02 RX ADMIN — HYDRALAZINE HYDROCHLORIDE 10 MG: 10 TABLET, FILM COATED ORAL at 12:40

## 2018-03-02 RX ADMIN — LISINOPRIL 10 MG: 5 TABLET ORAL at 17:07

## 2018-03-02 RX ADMIN — LISINOPRIL 10 MG: 5 TABLET ORAL at 20:06

## 2018-03-02 NOTE — PROGRESS NOTES
Pt seen in ICU s/p TIA. States lives with wife. Was independent of ADL's prior to admission. Does not use anything to ambulate with. He is physician himself. Able to get rx with drug plan and confirms insurance. He will be seeing his partner for PCP. No needs voiced for d/c. Anxious for d/c, as son out of town to play soccer. He is aware of importance of f/u and that CM available if any needs should arise. Care Management Interventions  PCP Verified by CM: Yes  Mode of Transport at Discharge: Other (see comment)  Physical Therapy Consult: Yes  Occupational Therapy Consult: Yes  Speech Therapy Consult: Yes  Current Support Network: Lives with Spouse, Own Home  Confirm Follow Up Transport: Family  Plan discussed with Pt/Family/Caregiver: Yes  Freedom of Choice Offered:  Yes

## 2018-03-02 NOTE — INTERDISCIPLINARY ROUNDS
Interdisciplinary team rounds were held 3/2/2018 with the following team members:Care Management, Nursing, Nurse Practitioner, Nutrition, Palliative Care, Patient Relations, Pharmacy, Physical Therapy, Physician, Physician's Assistant, Respiratory Therapy and Clinical Coordinator. Plan of care discussed.  See clinical pathway and/or care plan for interventions and desired outcomes

## 2018-03-02 NOTE — CONSULTS
Physical Medicine & Rehabilitation Note-consult    Patient: Yamilet Luu. MRN: [id number]  SSN: [id number]    YOB: 1950  Age: 78 y.o. Sex: male      Admit Date: 3/1/2018  Admitting Physician: Jaison Graves MD    Medical Decision Making/Plan/Recommend:   Patient seen and examined. Fortunately weakness left side resolved. Patient feels he has no residual neurologic  deficits. PT tolerated well. Patient is independent with all activities. Expect home discharge when medically stable. There appears no need for further rehab program as out pt. Thank you for the opportunity to participate in the care of this patient.     Signed By: Oneal Davis MD     March 2, 2018

## 2018-03-02 NOTE — PROGRESS NOTES
LEAPFROG EVALUATION: [facility]    The patient is currently in the ICU for TIA and hypertensive urgency. Management by Dr Yadira Curran. Patient is currently hemodynamically stable. Patient has no needs identified for Intensivist management in the ICU setting at this time. Please notify us if can be of assistance. No charge billed to the patient. Thank you.     Visit Vitals    /77    Pulse (!) 54    Temp 97.9 °F (36.6 °C)    Resp 15    Ht 5' 7\" (1.702 m)    Wt 159 lb 2.8 oz (72.2 kg)    SpO2 98%    BMI 24.93 kg/m2         Jillian Flowers, NP

## 2018-03-02 NOTE — PROGRESS NOTES
Bedside and Verbal shift change report given to Oneyda Eckert RN by Yariel Cormier RN. Report included the following information SBAR, Kardex, ED Summary, Intake/Output, MAR, Accordion, Recent Results, Med Rec Status, Cardiac Rhythm SB/SR, PAC and Alarm Parameters . Dual neuro assessment performed.

## 2018-03-02 NOTE — PROGRESS NOTES
Problem: Falls - Risk of  Goal: *Absence of Falls  Document Elieser Fall Risk and appropriate interventions in the flowsheet.    Outcome: Progressing Towards Goal  Fall Risk Interventions:            Medication Interventions: Bed/chair exit alarm, Evaluate medications/consider consulting pharmacy

## 2018-03-02 NOTE — PROGRESS NOTES
Hospitalist Progress Note    3/2/2018  Admit Date: 3/1/2018  1:27 PM   NAME: Helen Newberry. :  1950   MRN:  [id number]   Attending: Noble Cason MD  PCP:  None    SUBJECTIVE:   78years old M with PMH of ? HTN, previous R eye surgery presented to the ED after having L side weakness episode. Patient stated after getting into his car he noticed having LLE heaviness. While driving around noon patient developed LUE heaviness and L facial droop. Patient was concern about stroke and drove to the hospital. Patient stated after arriving to parking lot symptoms resolved. Patient stated this is the first time having the symptoms. 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 He was admitted to ICU for cardene gtt. Seen by tele neuro and Neurologist Dr. Mia Vasquez. 3/2: Off cardene gtt. No neuro symptoms, no headache or visual changes. Hr 48-55 on monitor, no A fib. Nursing notes and chart reviewed. Review of Systems negative with exception of pertinent positives noted above. PHYSICAL EXAM     Visit Vitals    BP (!) 168/96    Pulse 64    Temp 97.9 °F (36.6 °C)    Resp 9    Ht 5' 7\" (1.702 m)    Wt 72.2 kg (159 lb 2.8 oz)    SpO2 97%    BMI 24.93 kg/m2      Temp (24hrs), Av.9 °F (36.6 °C), Min:97.2 °F (36.2 °C), Max:98.3 °F (36.8 °C)    Oxygen Therapy  O2 Sat (%): 97 % (18 1603)  Pulse via Oximetry: 55 beats per minute (18 1603)  O2 Device: Room air (18 0703)    Intake/Output Summary (Last 24 hours) at 18 1712  Last data filed at 18 0703   Gross per 24 hour   Intake                0 ml   Output              725 ml   Net             -725 ml       General: No acute distress. Alert.    Head:  AT/NC  Lungs:  CTABL. Heart:  RRR, no murmur, rub, or gallop  Abdomen: Soft, non-distended, non-tender, +bs  Extremities: No cyanosis or clubbing. Neurologic:  No focal deficits. Moves all extremities.   Skin:  No rash      LABS AND STUDIES:  Personally reviewed all labs, meds, and studies for past 24hrs. 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  TIA (transient ischemic attack) 03/01/2018    Hypertensive emergency 03/01/2018       PLAN:    · Consulted Neurologist, appreciate recs. CT/MRI -ve for stroke, Plan for Plavix for 3 weeks given intracranial arterial stenosis. · On Lisinopril 10mg and HCTZ 25mg. BP went up to 170s and given prn hydralazine with good response. Add another 10mg of lisinopril now and switch to 20 mg daily from tomorrow. · Lipitor increased to 80mg by neurology. · Appreciate Neuro input. · Outpt Sleep Study importance discussed. 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 · Transfer to remote tele. Dispo: Hopefully DC in am.     DVT ppx:  Not needed, pt ambulatory  Discussed plan with pt who is in agreement. All questions answered.     Signed By: Kristina Gordon MD     March 2, 2018

## 2018-03-02 NOTE — CONSULTS
Consult    Patient: Mago Rivera MRN: [id number]  SSN: [id number]    YOB: 1950  Age: 78 y.o. Sex: male      Subjective:      Mago Rivera is a 78 y.o. male who is being seen for evaluation of left sided weakness    The patient is an internist.  He had left his office and noted as he got into his car. He had scuffed the sole of his left foot as he swung it into the car on the dorsal and thought that was unusual subsequently while driving experience progressive onsets in terms of left sided weakness involving the arm as well and when he checked in the rearview mirror he noted he had a left sided facial droop. [facility] himself [facility] to [facility].  He was seen by telephone neurology and had studies. By the time he arrived in the emergency room his left sided symptomatology had resolved. He was noted to be significantly hypertensive    The patient reports that on occasion when he is seen the dermatologist in the past when he would arrive in the office he would be somewhat hypertensive but the pressure would always normalized and normalize quite quickly. He accordingly has not paid any attention in terms of blood pressure monitoring. He is active exercises a great deal and does not have dyslipidemia. No prior history of any transient neurologic deficit. he has always been a nonsmoker no significant alcohol    History reviewed. No pertinent past medical history. Past Surgical History:   Procedure Laterality Date    HX GI      HX ORTHOPAEDIC        History reviewed. No pertinent family history.   Social History   Substance Use Topics    Smoking status: Not on file    Smokeless tobacco: Not on file    Alcohol use Not on file      Current Facility-Administered Medications   Medication Dose Route Frequency Provider Last Rate Last Dose    aspirin (ASPIRIN) tablet 325 mg  325 mg Oral DAILY Gina Spence MD   325 mg at 03/02/18 1000    [START ON 3/3/2018] lisinopril (PRINIVIL, ZESTRIL) tablet 20 mg  20 mg Oral DAILY Jen Andrew MD        sodium chloride (NS) flush 5-10 mL  5-10 mL IntraVENous Q8H Roge Correa MD   10 mL at 18 0547    sodium chloride (NS) flush 5-10 mL  5-10 mL IntraVENous PRN Myranda Villarreal MD        atorvastatin (LIPITOR) tablet 80 mg  80 mg Oral QHS Myranda Villarreal MD   80 mg at 18 2133    hydroCHLOROthiazide (HYDRODIURIL) tablet 25 mg  25 mg Oral DAILY Roge Correa MD   25 mg at 18 0849        No Known Allergies    Review of Systems:  Denies shortness of breath denies palpitations did have a transient visual disturbance which was felt to be a ophthalmic migraine which resolved in 15 minutes 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 No recurrent event since. No recent injury no head injury no history of seizures denies olfactory or gustatory phenomena no history of GI issues in terms of hematemesis melena. Family history    Mother  at 37 of a glioblastoma. There is no pertinent history of vascular disease or hypertension    Objective:     Vitals:    18 1103 18 1133 18 1208 18 1233   BP: 146/84 143/80 (!) 174/96 (!) 169/94   Pulse: 62 64 (!) 59 (!) 58   Resp: (!) 37 15 (!) 85 22   Temp:   97.9 °F (36.6 °C)    SpO2: 96% 96% 99% 97%   Weight:       Height:        CT scan of the brain was negative for hemorrhage.     MRI demonstrates the presence of what I would describe his fairly extensive deep white matter ischemic change in characteristic areas    CT angiogram demonstrates presence of intracranial vascular stenoses which are multifocal    Physical Exam:  Alert cooperative gentleman appears younger than his stated age nonacute distress at rest examination adductors unremarkable carotid arteries full without bruit    Cranial nerves II through XII within normal limits the optic disks are sharp and flat visual fields are normal the left eye is better visualized in the right where he had an old injury related to a fishhook injury. There is an asymmetry to the pupils as result thereof    Motor examination discloses no focal drift weakness atrophy or symmetry and tone the deep tendon reflexes are 2+ symmetrical toe signs are down    Cerebellar functions demonstrate normal finger to nose heel knee to shin testing    I did not walk the patient as at the time I was examining him his blood pressure was at 170 and he is in the ICU    Assessment:   Clearly had a transient neurologic event TIA by definition in terms of duration. No change on diffusion-weighted imaging      Hospital Problems  Never Reviewed          Codes Class Noted POA    * (Principal)TIA (transient ischemic attack) ICD-10-CM: G45.9  ICD-9-CM: 435.9  3/1/2018 Yes        Hypertensive emergency ICD-10-CM: I16.1  ICD-9-CM: 401.9  3/1/2018 Yes              Plan:   Aggressive management of hypertension Ace or ARB agents. I would aim for an LDL below 70    I would suggest a sleep study. His wife does report that he snores to some degree not a significant problem but now that he has had an ischemic event and given the findings on MRI I think this is quite important    We have discussed the dosage of aspirin. I would add Plavix as he has multiple intracranial vascular narrowing certainly for a period of 21 days.     Cardiac monitoring for potential coexisting atrial fibrillation discussed    I appreciate being involved in his care        Signed By: Columba Ji MD     March 2, 2018

## 2018-03-02 NOTE — PROGRESS NOTES
Problem: Self Care Deficits Care Plan (Adult)  Goal: *Acute Goals and Plan of Care (Insert Text)    OCCUPATIONAL THERAPY: Initial Assessment, Discharge and AM 3/2/2018  INPATIENT: Hospital Day: 2  Payor: Kun Murphy / Plan: Izabella Lara PPO / Product Type: PPO /      NAME/AGE/GENDER: Marta Malin is a 78 y.o. male   PRIMARY DIAGNOSIS:  Hypertensive emergency  TIA (transient ischemic attack)  Hypertensive emergency  TIA (transient ischemic attack) TIA (transient ischemic attack) TIA (transient ischemic attack)        ICD-10: Treatment Diagnosis:    · Generalized Muscle Weakness (M62.81)  · Other lack of cordination (R27.8)   Precautions/Allergies:     Review of patient's allergies indicates no known allergies. ASSESSMENT:     Mr. Boris Estevez presents to the hospital with L sided weakness/facial droop. Pt reports symptoms had resolved prior to arriving to the ER. Pt's MRI is negative. Pt was working with PT upon arrival. Pt is alert, oriented x 4, and appropriate for age. 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 He is aware of the symptoms of CVA. Pt demonstrates good balance with functional mobility and with tasks within the room with no complaints of dizziness with activity. Pt has no visual disturbances with intact visual fields, saccadic movement, and tracking. Pt's upper body strength, coordination, and sensation is intact. Pt did have some elevated BP after treatment (170/100). Nursing staff was notified of BP. Pt is currently functioning at baseline for ADL/functional transfers. Pt has no further OT needs at this time.      This section established at most recent assessment   PROBLEM LIST (Impairments causing functional limitations):  1. n/a   INTERVENTIONS PLANNED: (Benefits and precautions of occupational therapy have been discussed with the patient.)  1. n/a     TREATMENT PLAN: Frequency/Duration: Discharge  Rehabilitation Potential For Stated Goals: n/a     RECOMMENDED REHABILITATION/EQUIPMENT: (at time of discharge pending progress): Due to the probability of continued deficits (see above) this patient will not likely need continued skilled occupational therapy after discharge. Equipment:    None at this time              OCCUPATIONAL PROFILE AND HISTORY:   History of Present Injury/Illness (Reason for Referral):  See H&P  Past Medical History/Comorbidities:   Mr. Charlie Mcdonald  has no past medical history on file. Mr. Charlie Mcdonald  has a past surgical history that includes hx gi and hx orthopaedic. Social History/Living Environment:   Home Environment: Private residence  # Steps to Enter: 3  One/Two Story Residence: Two story  # of Interior Steps: 14  Living Alone: No  Support Systems: Child(joel), Family member(s), Spouse/Significant Other/Partner  Patient Expects to be Discharged to[de-identified] Private residence  Current DME Used/Available at Home: None  Prior Level of Function/Work/Activity:  Lives with wife. Independent. Active. Personal Factors:          Profession:  Physician     Number of Personal Factors/Comorbidities that affect the Plan of Care: Brief history (0):  LOW COMPLEXITY   ASSESSMENT OF OCCUPATIONAL PERFORMANCE[de-identified]   Activities of Daily Living:           Basic ADLs (From Assessment) Complex ADLs (From Assessment)   Basic ADL  Feeding: Independent  Oral Facial Hygiene/Grooming: Independent  Bathing: Independent  Upper Body Dressing: Independent  Lower Body Dressing: Independent  Toileting: Independent Instrumental ADL  Meal Preparation: Independent  Homemaking: Independent   Grooming/Bathing/Dressing Activities of Daily Living     Cognitive Retraining  Safety/Judgement: Awareness of environment;Good awareness of safety precautions; Insight into deficits                 Functional Transfers  Toilet Transfer : Independent  Tub Transfer: Independent  Shower Transfer: Independent     Bed/Mat Mobility  Rolling: Independent  Supine to Sit: Independent  Sit to Supine: Independent  Sit to Stand: Independent  Bed to Chair: Independent  Scooting: Independent       Most Recent Physical Functioning:   Gross Assessment:  AROM: Within functional limits  Strength: Within functional limits  Coordination: Within functional limits  Tone: Normal  Sensation: Intact               Posture:     Balance:  Sitting: Intact  Standing: Intact Bed Mobility:  Rolling: Independent  Supine to Sit: Independent  Sit to Supine: Independent  Scooting: Independent  Wheelchair Mobility:     Transfers:  Sit to Stand: Independent  Stand to Sit: Independent  Bed to Chair: Independent              Patient Vitals for the past 6 hrs:   BP BP Patient Position SpO2 Pulse   18 0348 113/68 - 97 % (!) 53   18 0556 106/62 - 95 % (!) 58   18 0603 104/60 - 95 % (!) 53   18 0632 124/72 - 95 % (!) 53   18 0703 121/75 - 97 % (!) 53   18 0733 119/77 - 98 % (!) 54   18 0849 135/81 - - (!) 58   18 0910 (!) 170/100 Post activity - -       Mental Status  Neurologic State: Alert  Orientation Level: Oriented X4  Cognition: Appropriate decision making, Appropriate for age attention/concentration, Follows commands  Perception: Appears intact  Perseveration: No perseveration noted  Safety/Judgement: Awareness of environment, Good awareness of safety precautions, Insight into deficits                          Physical Skills Involved:  1. Range of Motion  2. Balance  3. Strength Cognitive Skills Affected (resulting in the inability to perform in a timely and safe manner):  1. WNL Psychosocial Skills Affected:  1. n/a   Number of elements that affect the Plan of Care: 1-3:  LOW COMPLEXITY   CLINICAL DECISION MAKIN [facility] [address] [address] AM-PAC 6 Clicks   Daily Activity Inpatient Short Form  How much help from another person does the patient currently need. .. Total A Lot A Little None   1. Putting on and taking off regular lower body clothing? [] 1   [] 2   [] 3   [x] 4   2.   Bathing (including washing, rinsing, drying)? [] 1   [] 2   [] 3   [x] 4   3. Toileting, which includes using toilet, bedpan or urinal?   [] 1   [] 2   [] 3   [x] 4   4. Putting on and taking off regular upper body clothing? [] 1   [] 2   [] 3   [x] 4   5. Taking care of personal grooming such as brushing teeth? [] 1   [] 2   [] 3   [x] 4   6. Eating meals? [] 1   [] 2   [] 3   [x] 4   © 2007, Trustees of [address] [place] [address] [address] [address], under license to [employer]. All rights reserved      Score:  Initial: 24 Most Recent: X (Date: -- )    Interpretation of Tool:  Represents activities that are increasingly more difficult (i.e. Bed mobility, Transfers, Gait). Score 24 23 22-20 19-15 14-10 9-7 6     Modifier CH CI CJ CK CL CM CN      ? Self Care:     - CURRENT STATUS: CH - 0% impaired, limited or restricted    - GOAL STATUS: CH - 0% impaired, limited or restricted    - D/C STATUS:  [address] [place] [address] - 0% impaired, limited or restricted  Payor: Ximena Grover / Plan: Damien Weinstein PPO / Product Type: PPO /      Medical Necessity:     · n/a. Reason for Services/Other Comments:  · n/a. Use of outcome tool(s) and clinical judgement create a POC that gives a: LOW COMPLEXITY         TREATMENT:   (In addition to Assessment/Re-Assessment sessions the following treatments were rendered)     Pre-treatment Symptoms/Complaints:    Pain: Initial:   Pain Intensity 1: 0  Post Session:  0/10     Assessment/Reassessment only, no treatment provided today    Braces/Orthotics/Lines/Etc:   · ICU monitor  · O2 Device: Room air  Treatment/Session Assessment:    · Response to Treatment:  Evaluation only. · Interdisciplinary Collaboration:   o Physical Therapist  o Occupational Therapist  o Registered Nurse  · After treatment position/precautions:   o Supine in bed  o Bed/Chair-wheels locked  o Bed in low position  o Call light within reach  o RN notified   · Compliance with Program/Exercises: n/a  · Recommendations/Intent for next treatment session:  Discharge.   Total Treatment Duration:  OT Patient Time In/Time Out  Time In: 0910  Time Out: [address], OT

## 2018-03-02 NOTE — PROGRESS NOTES
Problem: Mobility Impaired (Adult and Pediatric)  Goal: *Acute Goals and Plan of Care (Insert Text)  Patient currently functioning independently with no gait disturbance, no balance deficits and no strength/sensory deficits. Will d/c from therapy at this time. PHYSICAL THERAPY: Initial Assessment, Discharge, AM 3/2/2018  INPATIENT: Hospital Day: 2  Payor: Heather Ray / Plan: Siddharth Elms PPO / Product Type: PPO /      NAME/AGE/GENDER: Mike Bassett is a 78 y.o. male   PRIMARY DIAGNOSIS: Hypertensive emergency  TIA (transient ischemic attack)  Hypertensive emergency  TIA (transient ischemic attack) TIA (transient ischemic attack) TIA (transient ischemic attack)        ICD-10: Treatment Diagnosis:   · Difficulty in walking, Not elsewhere classified (R26.2)   Precaution/Allergies:  Review of patient's allergies indicates no known allergies. ASSESSMENT:     Mr. Salazar is a 78 y.o. male with workup for TIA and hypertensive emergency. MRI and CT scan are negative. 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 He is supine in bed and agreeable to therapy, performed all mobility independently with no loss of balance including ambulation, dynamic balance challenges. BLE intact sensation to light touch L2-S1, strength 5/5 BLE. Patient educated on FAST signs/symptoms of CVA. Noted elevated BP post session 170/100, RN was notified and patient asymptomatic. He is currently functioning independently and has no residual deficits. Will d/c from therapy. RECOMMENDED REHABILITATION/EQUIPMENT: (at time of discharge pending progress): Due to the probability of continued deficits (see above) this patient will not likely need continued skilled physical therapy after discharge.   Equipment:    None at this time              HISTORY:   History of Present Injury/Illness (Reason for Referral):  Per MD: 78years old M with PMH of ? HTN, previous R eye surgery presented to the ED after having L side weakness episode. Patient stated after getting into his car today he noticed having LLE heaviness. While driving around noon patient developed LUE heaviness and L facial droop. Patient was concern about stroke and drove to the hospital. Patient stated after arriving to parking lot symptoms resolved. Patient stated this is the first time having the symptoms. 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 Patient stated in the past his BP has been high but upon rechecking BP it will be on normal ranges. Patient is not taking any BP meds. Patient stated is usually very healthy. Patient denies history of previous stroke, HTN. Heart disease or DM.      In the ED BP was 230/ 112 and CT brain did not show any acute disease. Tel neurology impression was TIA and recommended to keep BP below 130/80. Past Medical History/Comorbidities:   Mr. Stephanie Pritchard  has no past medical history on file. Mr. Stephanie Pritchard  has a past surgical history that includes hx gi and hx orthopaedic. Social History/Living Environment:   Home Environment: Private residence  # Steps to Enter: 3  One/Two Story Residence: Two story  # of Interior Steps: 14  Living Alone: No  Support Systems: Child(joel), Family member(s), Spouse/Significant Other/Partner  Patient Expects to be Discharged to[de-identified] Private residence  Current DME Used/Available at Home: None  Prior Level of Function/Work/Activity:  Independent with mobility, working as a physician, riding his bike. Number of Personal Factors/Comorbidities that affect the Plan of Care: 0: LOW COMPLEXITY   EXAMINATION:   Most Recent Physical Functioning:   Gross Assessment:  AROM: Within functional limits  Strength: Within functional limits  Coordination: Within functional limits  Tone: Normal  Sensation: Intact               Posture:  Posture (WDL): Exceptions to WDL  Posture Assessment:  Forward head  Balance:  Sitting: Intact  Standing: Intact Bed Mobility:  Rolling: Independent  Supine to Sit: Independent  Sit to Supine: Independent  Scooting: Independent  Wheelchair Mobility:     Transfers:  Sit to Stand: Independent  Stand to Sit: Independent  Gait:     Distance (ft): 250 Feet (ft)  Assistive Device:  (none)  Ambulation - Level of Assistance: Independent      Body Structures Involved:  1. Nerves  2. Muscles Body Functions Affected:  1. Sensory/Pain  2. Neuromusculoskeletal  3. Movement Related Activities and Participation Affected:  1. Community, Social and [place]   Number of elements that affect the Plan of Care: 3: MODERATE COMPLEXITY   CLINICAL PRESENTATION:   Presentation: Stable and uncomplicated: LOW COMPLEXITY   CLINICAL DECISION MAKING:   [employer] AM-PAC 6 Clicks   Basic Mobility Inpatient Short Form  How much difficulty does the patient currently have. .. Unable A Lot A Little None   1. Turning over in bed (including adjusting bedclothes, sheets and blankets)? [] 1   [] 2   [] 3   [x] 4   2. Sitting down on and standing up from a chair with arms ( e.g., wheelchair, bedside commode, etc.)   [] 1   [] 2   [] 3   [x] 4   3. Moving from lying on back to sitting on the side of the bed? [] 1   [] 2   [] 3   [x] 4   How much help from another person does the patient currently need. .. Total A Lot A Little None   4. Moving to and from a bed to a chair (including a wheelchair)? [] 1   [] 2   [] 3   [x] 4   5. Need to walk in hospital room? [] 1   [] 2   [] 3   [x] 4   6. Climbing 3-5 steps with a railing? [] 1   [] 2   [] 3   [x] 4   © 2007, Trustees of [facility] [employer], under license to [employer]. All rights reserved      Score:  Initial: 24 Most Recent: X (Date: -- )    Interpretation of Tool:  Represents activities that are increasingly more difficult (i.e. Bed mobility, Transfers, Gait). Score 24 23 22-20 19-15 14-10 9-7 6     Modifier CH CI CJ CK CL CM CN      ?  Mobility - Walking and Moving Around:  - CURRENT STATUS: CH - 0% impaired, limited or restricted    - GOAL STATUS: CH - 0% impaired, limited or restricted    - D/C STATUS:  [address] [place] [address] - 0% impaired, limited or restricted  Payor: Jarrod Tan / Plan: Martina Salguero PPO / Product Type: PPO /       Use of outcome tool(s) and clinical judgement create a POC that gives a: Clear prediction of patient's progress: LOW COMPLEXITY            TREATMENT:   (In addition to Assessment/Re-Assessment sessions the following treatments were rendered)   Pre-treatment Symptoms/Complaints:  none  Pain: Initial:   Pain Intensity 1: 0  Post Session:  0/10     Assessment/Reassessment only, no treatment provided today    Braces/Orthotics/Lines/Etc:   · O2 Device: Room air  Treatment/Session Assessment:    · Response to Treatment:  Patient tolerated all activity well. · Interdisciplinary Collaboration:   o Physical Therapist  o Occupational Therapist  o Registered Nurse  · After treatment position/precautions:   o Supine in bed  o Bed/Chair-wheels locked  o Bed in low position  o Call light within reach  o RN notified   · Compliance with Program/Exercises: compliant all of the time.   Total Treatment Duration:  PT Patient Time In/Time Out  Time In: 0858  Time Out: Bernice Capellan DPT

## 2018-03-02 NOTE — PROGRESS NOTES
Speech language pathology: bedside swallow note: Initial Assessment and Discharge    NAME/AGE/GENDER: Jessica Nick is a 78 y.o. male  DATE: 3/2/2018  PRIMARY DIAGNOSIS: Hypertensive emergency  TIA (transient ischemic attack)  Hypertensive emergency  TIA (transient ischemic attack)       ICD-10: Treatment Diagnosis: R13.12 Oropharyngeal Dysphagia. INTERDISCIPLINARY COLLABORATION: Registered Nurse  PRECAUTIONS/ALLERGIES: Review of patient's allergies indicates no known allergies. ASSESSMENT:Based on the objective data described below, Mr. Evans Smith presents with swallow function that is within normal limits. He reports mild left sided facial weakness upon admission, which has since resolved. No lingual deviation observed and speech 100% intelligible. Patient consumed serial sips of thin via straw, mixed consistency, and solids. Timely mastication of chewable textures. Adequate oral clearing. Single swallows palpated. No cough, throat clear, or change in vocal quality observed. Medications administered by RN- whole with liquid wash. No difficulty observed. Recommend continue regular diet and thin liquids. Medications whole with liquid wash. During session, patient communicated at the conversational level with appropriate comprehension and sentence structure. No word finding difficulties. Appropriate recall of recent events. MRI and CT negative for acute changes. No cognitive-communication concerns. No further speech therapy indicated at this time as speech, language, cognition, and swallowing are within normal limits. ?????? ? ? This section established at most recent assessment??????????  PROBLEM LIST (Impairments causing functional limitations):  1. Oropharyngeal dysphagia- no symptoms identified  REHABILITATION POTENTIAL FOR STATED GOALS: Excellent  PLAN OF CARE:   Patient will benefit from skilled intervention to address the following impairments.   RECOMMENDATIONS AND PLANNED INTERVENTIONS (Benefits and precautions of therapy have been discussed with the patient.):  · PO:  Regular  · Liquids:  regular thin  MEDICATIONS:  · With liquid  COMPENSATORY STRATEGIES/MODIFICATIONS INCLUDING:  · None  OTHER RECOMMENDATIONS (including follow up treatment recommendations):   · none  RECOMMENDED DIET MODIFICATIONS DISCUSSED WITH:  · NA  FREQUENCY/DURATION: Speech, language, cognitive, and swallowing function are within normal limits. Speech therapy indicated at this time. RECOMMENDED REHABILITATION/EQUIPMENT: (at time of discharge pending progress): Due to the probability of continued deficits (see above) this patient will not likely need continued skilled speech therapy after discharge. SUBJECTIVE:   Alert, cooperative, oriented  History of Present Injury/Illness: Mr. Annel Freed  has no past medical history on file. David Andrade He also  has a past surgical history that includes hx gi and hx orthopaedic. Present Symptoms: No dysphagia symptoms identifed   Pain Intensity 1: 0  Current Medications:   No current facility-administered medications on file prior to encounter. No current outpatient prescriptions on file prior to encounter. Current Dietary Status:  Regular/thin     Social History/Home Situation:    Home Environment: Private residence  # Steps to Enter: 3  One/Two Story Residence: Two story  Living Alone: No  Support Systems: Child(joel), Family member(s), Spouse/Significant Other/Partner  Patient Expects to be Discharged to[de-identified] Private residence  Current DME Used/Available at Home: None  OBJECTIVE:   Respiratory Status:  Room air     MRI/CT Results:CT No acute intracranial abnormality. 2. Microvascular ischemia, MRI No acute intracranial findings.  Specifically, no evidence for acute  infarction is seen  Oral Motor Structure/Speech:  Oral-Motor Structure/Motor Speech  Labial: No impairment  Dentition: Natural, Intact  Oral Hygiene: Adequate  Lingual: No impairment    Cognitive and Communication Status:  Neurologic State: Alert  Orientation Level: Oriented X4  Cognition: Follows commands  Perception: Appears intact  Perseveration: No perseveration noted  Safety/Judgement: Fall prevention    BEDSIDE SWALLOW EVALUATION  Oral Assessment:  Oral Assessment  Labial: No impairment  Dentition: Natural;Intact  Oral Hygiene: Adequate  Lingual: No impairment  P.O. Trials:  Patient Position: Upright in bed    The patient was given tsp-small bite amounts of the following:   Consistency Presented: Thin liquid;Puree; Solid;Mixed consistency  How Presented: Self-fed/presented;Cup/sip;Spoon;Straw;Successive swallows    ORAL PHASE:  Bolus Acceptance: No impairment  Bolus Formation/Control: No impairment  Propulsion: No impairment          PHARYNGEAL PHASE:  Initiation of Swallow: No impairment     Aspiration Signs/Symptoms: None  Vocal Quality: No impairment           Pharyngeal Phase Characteristics: No impairment, issues, or problems     OTHER OBSERVATIONS:  Rate/bite size: WNL   Endurance: WNL   Comments:       Tool Used: Dysphagia Outcome and Severity Scale (JIL)    Score Comments   Normal Diet  [x] 7 With no strategies or extra time needed   Functional Swallow  [] 6 May have mild oral or pharyngeal delay       Mild Dysphagia    [] 5 Which may require one diet consistency restricted (those who demonstrate penetration which is entirely cleared on MBS would be included)   Mild-Moderate Dysphagia  [] 4 With 1-2 diet consistencies restricted       Moderate Dysphagia  [] 3 With 2 or more diet consistencies restricted       Moderately Severe Dysphagia  [] 2 With partial PO strategies (trials with ST only)       Severe Dysphagia  [] 1 With inability to tolerate any PO safely          Score:  Initial: 7 Most Recent: X (Date: -- )   Interpretation of Tool: The Dysphagia Outcome and Severity Scale (JIL) is a simple, easy-to-use, 7-point scale developed to systematically rate the functional severity of dysphagia based on objective assessment and make recommendations for diet level, independence level, and type of nutrition. Score 7 6 5 4 3 2 1   Modifier CH CI CJ CK CL CM CN   ? Swallowing:     - CURRENT STATUS: CH - 0% impaired, limited or restricted    - GOAL STATUS:  CH - 0% impaired, limited or restricted    - D/C STATUS:  CH - 0% impaired, limited or restricted  Payor: Cherelle Simmons / Plan: Colletta Beery PPO / Product Type: PPO /     TREATMENT:    (In addition to Assessment/Re-Assessment sessions the following treatments were rendered)  Assessment/Reassessment only, no treatment provided today  MODALITIES:                                                                    ORAL MOTOR  EXERCISES:                                                                                                                                                                      LARYNGEAL / PHARYNGEAL EXERCISES:                                                                                                                                     __________________________________________________________________________________________________  Safety:   After treatment position/precautions:  · RN notified  · Upright in Bed  Recommendations/Intent for next treatment session: Speech, language,cognitive, and swallowing within normal limits. Speech therapy not indicated at this time.    Total Treatment Duration:  Time In: 0845  Time Out: 5038    EDEN Martin, CCC-SLP, CBIS

## 2018-03-03 VITALS
HEIGHT: 67 IN | HEART RATE: 58 BPM | WEIGHT: 159.83 LBS | OXYGEN SATURATION: 98 % | TEMPERATURE: 97.8 F | BODY MASS INDEX: 25.09 KG/M2 | SYSTOLIC BLOOD PRESSURE: 153 MMHG | RESPIRATION RATE: 18 BRPM | DIASTOLIC BLOOD PRESSURE: 86 MMHG

## 2018-03-03 PROCEDURE — 74011250637 HC RX REV CODE- 250/637: Performed by: INTERNAL MEDICINE

## 2018-03-03 PROCEDURE — 99218 HC RM OBSERVATION: CPT

## 2018-03-03 RX ORDER — LISINOPRIL 40 MG/1
40 TABLET ORAL DAILY
Qty: 30 TAB | Refills: 1 | Status: SHIPPED | OUTPATIENT
Start: 2018-03-03

## 2018-03-03 RX ORDER — ATORVASTATIN CALCIUM 40 MG/1
40 TABLET, FILM COATED ORAL
Qty: 30 TAB | Refills: 1 | Status: SHIPPED | OUTPATIENT
Start: 2018-03-03

## 2018-03-03 RX ORDER — HYDROCHLOROTHIAZIDE 25 MG/1
25 TABLET ORAL DAILY
Qty: 30 TAB | Refills: 1 | Status: SHIPPED | OUTPATIENT
Start: 2018-03-03 | End: 2021-03-25 | Stop reason: ALTCHOICE

## 2018-03-03 RX ORDER — CLOPIDOGREL BISULFATE 75 MG/1
75 TABLET ORAL DAILY
Qty: 21 TAB | Refills: 0 | Status: SHIPPED | OUTPATIENT
Start: 2018-03-03 | End: 2021-03-25 | Stop reason: ALTCHOICE

## 2018-03-03 RX ADMIN — Medication 10 ML: at 05:51

## 2018-03-03 RX ADMIN — LISINOPRIL 30 MG: 20 TABLET ORAL at 08:37

## 2018-03-03 RX ADMIN — HYDROCHLOROTHIAZIDE 25 MG: 25 TABLET ORAL at 08:37

## 2018-03-03 RX ADMIN — ASPIRIN 325 MG ORAL TABLET 325 MG: 325 PILL ORAL at 08:37

## 2018-03-03 NOTE — PROGRESS NOTES
Bedside and Verbal shift change report given to Claudia Fischer RN (oncoming nurse) by Anoop Ellington (offgoing nurse). Report included the following information SBAR, Kardex, ED Summary, Procedure Summary, Intake/Output, MAR, Recent Results and Cardiac Rhythm NSR/SB.      dual neuro assessment completed

## 2018-03-03 NOTE — PROGRESS NOTES
Bedside and Verbal shift change report given to [address] [place] [address] (oncoming nurse) by Hiren Benitez (offgoing nurse).  Report included the following information SBAR, Kardex, Intake/Output, MAR, Recent Results and Cardiac Rhythm SB.

## 2018-03-03 NOTE — PROGRESS NOTES
Assessment complete. Chart reviewed. Patient A&Ox4, no facial droop,  strong and equal, no drift noted to any extremities. Lung sounds clear upper and lower bilat on room air. SB. BP hypertensive, systolic in the 579X, hospitalist updated. Peripheral vascular WDL. Bowel sounds active. Voiding to urinal. Moves all extremities without weakness. VSS. NAD. Will continue to monitor.

## 2018-03-03 NOTE — PROGRESS NOTES
Nurse has reported SBP going up to 160-170. Will give another stat dose of lisinopril 10 mg po and will increase daily dose to 30 mg po daily starting tomorrow.

## 2018-03-03 NOTE — DISCHARGE SUMMARY
Hospitalist Discharge Summary     Patient ID:  Fransisco Arenas.  [id number]  78 y.o.  1950  Admit date: 3/1/2018  1:27 PM  Discharge date and time: 3/3/2018  Attending: No att. 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 HPI:    78years old M with PMH of ? HTN, previous R eye surgery presented to the ED after having L side weakness episode. Patient stated after getting into his car today he noticed having LLE heaviness. While driving around noon patient developed LUE heaviness and L facial droop. Patient was concern about stroke and drove to the hospital. Patient stated after arriving to parking lot symptoms resolved. Patient stated this is the first time having the symptoms. 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 Patient stated in the past his BP has been high but upon rechecking BP it will be on normal ranges. Patient is not taking any BP meds. Patient stated is usually very healthy. Patient denies history of previous stroke, HTN. Heart disease or DM. In the ED BP was 230/ 112 and CT brain did not show any acute disease. Tel neurology impression was TIA and recommended to keep BP below 130/80. Hospital Course:    Pt was admitted to ICU on cardene gtt. He was also started on Lisinopril and HCTZ. CT head/MRI ruled out stroke. CTA head showed L PCA and L MCA segmental Stenosis. TTE was unremarkable. He was Seen by Neuro whoi recommended plavix for 3 weeks and then ASA 325mg. Also strongly recommended Sleep study.  BP improved and weaned off gtt. Pt feels back to baseline and will follow up with a PCP and Sleep center. Prescriptions given for lisinopril, HCTZ, Plavix and Protonix. Plan discussed with pt who's in agreement with the plan. All questions answered. Pt was stable at time of discharge. Follow up as per below. Significant Diagnostic Imaging:     CTA head and Neck:  FINDINGS:     CT angiography was performed of the neck and head with contrast and  three-dimensional CT angiography reconstruction and reformat was performed. NASCET criteria as needed. CT dose reduction was achieved through use of a  standardized protocol tailored for this examination and automatic exposure  control for dose modulation.      Aortic arch is patent. The left common carotid artery is patent. The left  internal carotid artery is patent.     The innominate artery and right common carotid arteries are patent. The right  internal carotid artery is patent. The left subclavian artery is patent. The  right subclavian artery is patent. The left vertebral artery is widely patent. The right vertebral artery is widely patent.     There is a focal stenosis at the M1 segment of the left middle cerebral artery.     The anterior cerebral arteries are patent. There is stenosis of the proximal P1  segment of the left posterior cerebral artery. Hypoplastic left posterior  communicating artery. The right posterior communicating artery is patent and  provides the dominant flow to the right posterior cerebral artery with a  hypoplastic P1 segment on the right. However, there is stenosis in the proximal  P2 segment of the right posterior cerebral artery. Also, there appears to be  stenosis in the proximal segment of both the right and left superior cerebellar  arteries.     IMPRESSION  IMPRESSION:     Multifocal intracranial arterial stenosis as described above. This includes the  left M1 and P1 segments and right P2 segment.     MRI brain:  Findings: Diffusion-weighted images show no evidence of acute infarction. Moderate chronic appearing periventricular white matter microvascular ischemic  changes are seen. T1-weighted and Gradient echo images no obvious changes of  intracranial hemorrhage although the sensitivity for subtle hemorrhage is  slightly increased for CT relative to MRI. The lateral ventricles are normal   in size and configuration. No abnormal extra axial fluid collections are seen. Orbits show no gross abnormality. No inflammatory changes are noted the  paranasal sinuses, middle ears and mastoid air cells.     IMPRESSION  IMPRESSION:   1. No acute intracranial findings. Specifically, no evidence for acute  infarction is seen. TTE:  SUMMARY:    -  Left ventricle: Systolic function was normal. Ejection fraction was  estimated in the range of 60 % to 65 %. There were no regional wall motion  abnormalities. -  Atrial septum: Contrast injection was performed. There was no   right-to-left  shunt, with provocative maneuvers to increase right atrial pressure. -  Tricuspid valve: There was mild regurgitation. -  Pericardium: There was no pericardial effusion. Labs: Results:       Chemistry Recent Labs      03/01/18   1323   GLU  102*   NA  140   K  3.6   CL  101   CO2  33*   BUN  18   CREA  1.24   CA  9.1   AGAP  6*   AP  116   TP  7.9   ALB  4.5   GLOB  3.4   AGRAT  1.3      CBC w/Diff Recent Labs      03/01/18   1323   WBC  6.4   RBC  5.47   HGB  17.3*   HCT  48.1   PLT  208   GRANS  58   LYMPH  26   EOS  3      Cardiac Enzymes No results for input(s): CPK, CKND1, BEATA in the last 72 hours.     No lab exists for component: CKRMB, TROIP   Coagulation Recent Labs      03/01/18   1322   INR  1.0       Last A1c Lab Results   Component Value Date/Time    Hemoglobin A1c 4.9 03/02/2018 04:00 AM      Lipid Panel Lab Results   Component Value Date/Time    Cholesterol, total 172 03/02/2018 04:00 AM    HDL Cholesterol 56 03/02/2018 04:00 AM LDL, calculated 99.2 03/02/2018 04:00 AM    VLDL, calculated 16.8 03/02/2018 04:00 AM    Triglyceride 84 03/02/2018 04:00 AM    CHOL/HDL Ratio 3.1 03/02/2018 04:00 AM      BNP No results for input(s): BNPP in the last 72 hours. Liver Enzymes Recent Labs      03/01/18   1323   TP  7.9   ALB  4.5   AP  116   SGOT  24      Thyroid Studies No results found for: T4, T3U, TSH, TSHEXT         Discharge Exam:  Patient Vitals for the past 24 hrs:   Temp Pulse Resp BP SpO2   03/03/18 0947 - - - 153/86 -   03/03/18 0932 - - - 158/85 -   03/03/18 0921 - - - 152/89 -   03/03/18 0903 - (!) 58 18 164/82 98 %   03/03/18 0803 - 66 24 (!) 156/101 96 %   03/03/18 0703 - (!) 58 14 128/78 98 %   03/03/18 0602 - (!) 55 19 127/81 98 %   03/03/18 0503 - (!) 49 13 124/70 97 %   03/03/18 0402 - (!) 56 20 119/69 97 %   03/03/18 0303 97.8 °F (36.6 °C) (!) 52 14 120/74 95 %   03/03/18 0204 - (!) 55 10 120/74 95 %   03/03/18 0103 - (!) 54 16 125/88 97 %   03/03/18 0004 - (!) 51 21 144/79 97 %   03/02/18 2302 97.9 °F (36.6 °C) 64 15 145/86 94 %   03/02/18 2202 - (!) 55 15 130/71 96 %   03/02/18 2133 - (!) 54 15 136/81 97 %   03/02/18 2103 - (!) 53 23 155/83 96 %   03/02/18 2033 - (!) 55 15 154/87 98 %   03/02/18 2003 - 61 - (!) 165/94 95 %   03/02/18 2002 - (!) 52 20 - 98 %   03/02/18 1933 - (!) 56 14 (!) 155/103 98 %   03/02/18 1902 97.8 °F (36.6 °C) (!) 57 20 160/90 96 %   03/02/18 1833 - (!) 56 19 157/86 98 %   03/02/18 1803 - (!) 59 27 151/90 96 %   03/02/18 1733 - (!) 55 10 155/87 98 %   03/02/18 1707 - 64 - (!) 168/96 -   03/02/18 1703 - 66 (!) 34 (!) 168/96 97 %   03/02/18 1633 - (!) 58 19 141/85 95 %     Visit Vitals    /86    Pulse (!) 58    Temp 97.8 °F (36.6 °C)    Resp 18    Ht 5' 7\" (1.702 m)    Wt 72.5 kg (159 lb 13.3 oz)    SpO2 98%    BMI 25.03 kg/m2     General appearance: alert, cooperative, no distress  Lungs: CTABL  Heart: RRR, no m/r/g  Abdomen: soft, non-tender.  Bowel sounds normal.   Extremities: no cyanosis. Neurologic: Grossly normal    Disposition: Home  Discharge Condition: stable  Patient Instructions: cardiac diet  Activity as tolerated  Discharge Medication List as of 3/3/2018  9:27 AM      START taking these medications    Details   atorvastatin (LIPITOR) 40 mg tablet Take 1 Tab by mouth nightly. , Print, Disp-30 Tab, R-1      hydroCHLOROthiazide (HYDRODIURIL) 25 mg tablet Take 1 Tab by mouth daily. , Print, Disp-30 Tab, R-1      lisinopril (PRINIVIL, ZESTRIL) 40 mg tablet Take 1 Tab by mouth daily. , Print, Disp-30 Tab, R-1      clopidogrel (PLAVIX) 75 mg tab Take 1 Tab by mouth daily. , Print, Disp-21 Tab, R-0             Vaccinations:   Pt screened by nursing for pneumococcal and influenza vaccination needs at discharge, will be ordered if needed and pt consented. There is no immunization history on file for this patient. Follow-up Information     Follow up With Details Comments Contact Info    None   None ([phone]) Patient stated that they have no PCP            Time spent in the discharge process was 35 minutes.       Signed By: Cecelia Paredes MD     March 3, 2018

## 2018-03-03 NOTE — DISCHARGE INSTRUCTIONS
Acute High Blood Pressure: Care Instructions  Your Care Instructions    Acute high blood pressure is very high blood pressure. It's a serious problem. Very high blood pressure can damage your brain, heart, eyes, and kidneys. You may have been given medicines to lower your blood pressure. You may have gotten them as pills or through a needle in one of your veins. This is called an IV. And maybe you were given other medicines too. These can be needed when high blood pressure causes other problems. To keep your blood pressure at a lower level, you may need to make healthy lifestyle changes. And you will probably need to take medicines. Be sure to follow up with your doctor about your blood pressure and what you can do about it. Follow-up care is a key part of your treatment and safety. Be sure to make and go to all appointments, and call your doctor if you are having problems. It's also a good idea to know your test results and keep a list of the medicines you take. How can you care for yourself at home? · See your doctor as often as he or she recommends. This is to make sure your blood pressure is under control. You will probably go at least 2 times a year. But it may be more often at first.  · Take your blood pressure medicine exactly as prescribed. You may take one or more types. They include diuretics, beta-blockers, ACE inhibitors, calcium channel blockers, and angiotensin II receptor blockers. Call your doctor if you think you are having a problem with your medicine. · If you take blood pressure medicine, talk to your doctor before you take decongestants or anti-inflammatory medicine, such as ibuprofen. These can raise blood pressure. · Learn how to check your blood pressure at home. Check it often. · Ask your doctor if it's okay to drink alcohol. · Talk to your doctor about lifestyle changes that can help blood pressure. These include being active and not smoking.   When should you call for help?  Call 911 anytime you think you may need emergency care. This may mean having symptoms that suggest that your blood pressure is causing a serious heart or blood vessel problem. Your blood pressure may be over 180/110. ? For example, call 911 if:  ? · You have symptoms of a heart attack. These may include:  ¨ Chest pain or pressure, or a strange feeling in the chest.  ¨ Sweating. ¨ Shortness of breath. ¨ Nausea or vomiting. ¨ Pain, pressure, or a strange feeling in the back, neck, jaw, or upper belly or in one or both shoulders or arms. ¨ Lightheadedness or sudden weakness. ¨ A fast or irregular heartbeat. ? · You have symptoms of a stroke. These may include:  ¨ Sudden numbness, tingling, weakness, or loss of movement in your face, arm, or leg, especially on only one side of your body. ¨ Sudden vision changes. ¨ Sudden trouble speaking. ¨ Sudden confusion or trouble understanding simple statements. ¨ Sudden problems with walking or balance. ¨ A sudden, severe headache that is different from past headaches. ? · You have severe back or belly pain. ?Do not wait until your blood pressure comes down on its own. Get help right away. ?Call your doctor now or seek immediate care if:  ? · Your blood pressure is much higher than normal (such as 180/110 or higher), but you don't have symptoms. ? · You think high blood pressure is causing symptoms, such as:  ¨ Severe headache. ¨ Blurry vision. ? Watch closely for changes in your health, and be sure to contact your doctor if:  ? · Your blood pressure measures 140/90 or higher at least 2 times. That means the top number is 140 or higher or the bottom number is 90 or higher, or both. ? · You think you may be having side effects from your blood pressure medicine. ? · Your blood pressure is usually normal, but it goes above normal at least 2 times. Where can you learn more? Go to http://theron-lesly.info/.   Enter O951 in the search box to learn more about \"Acute High Blood Pressure: Care Instructions. \"  Current as of: September 21, 2016  Content Version: 11.4  © 6864-8994 [employer]. Care instructions adapted under license by [employer] (which disclaims liability or warranty for this information). If you have questions about a medical condition or this instruction, always ask your healthcare professional. [place] [address] any warranty or liability for your use of this information. Learning About Transient Ischemic Attack (TIA)  What is a TIA? A transient ischemic attack (TIA) means that the blood flow to a part of the brain is blocked for a short time. A TIA feels like a stroke but usually lasts only 10 to 20 minutes. Unlike a stroke, a TIA does not cause lasting brain damage. A TIA is usually caused by a blood clot that blocks blood flow in the brain. A blood clot can form in another part of the body (often the heart) and travel through the bloodstream to the brain. When blood flow to part of the brain is blocked, the brain cells in that area are affected within seconds. This causes symptoms in parts of the body controlled by those brain cells. When the blood clot dissolves, blood flow returns, and the symptoms go away. Blood clots can be the result of hardening of the arteries (atherosclerosis) or a heart attack. Sometimes a TIA is caused by a sharp drop in blood pressure that reduces blood flow to the brain. This is called a \"low-flow\" TIA. It is not as common as a TIA caused by a blood clot. What happens after a TIA? TIA is a serious warning sign of a possible stroke in the future. If you have other medical conditions such as coronary artery disease or atherosclerosis, you may also have an increased risk for a heart attack. Talk to your doctor about your risk. Understanding your risk will help you and your doctor plan your treatment options.   You can do a lot to lower your chance of having another TIA or a stroke. Medicines can help, and you may also need to make lifestyle changes. What are the symptoms? Symptoms of a TIA are the same as symptoms of a stroke. But symptoms of a TIA don't last very long. Most of the time, they go away in 10 to 20 minutes. They may include:  · Sudden numbness, tingling, weakness, or loss of movement in your face, arm, or leg, especially on only one side of your body. · Sudden vision changes. · Sudden trouble speaking. · Sudden confusion or trouble understanding simple statements. · Sudden problems with walking or balance. If you have any of these symptoms, call 911 or other emergency services right away. Ask your family, friends, and coworkers to learn the signs of a TIA. They may notice these signs before you do. Make sure they know to call 911 if these signs appear. How is a TIA treated? If you've had a TIA, you may need more testing and treatment after you get checked by your doctor. If you have a high risk of stroke, you may have to stay in the hospital for treatment. Your treatment for a TIA may include taking medicines to prevent blood clots or a stroke, or having surgery to reopen narrow arteries. How can you prevent another TIA? · Work with your doctor to treat any health problems you have. High blood pressure, high cholesterol, atrial fibrillation, and diabetes all raise your chances of having a stroke. · Be safe with medicines. Take your medicine exactly as prescribed. Call your doctor if you think you are having a problem with your medicine. · Have a healthy lifestyle. ¨ Do not smoke or allow others to smoke around you. If you need help quitting, talk to your doctor about stop-smoking programs and medicines. These can increase your chances of quitting for good. Smoking makes a stroke more likely. ¨ Limit alcohol to 2 drinks a day for men and 1 drink a day for women. ¨ Lose weight if you need to.  A healthy weight will help you keep your heart and body healthy. ¨ Be active. Ask your doctor what type and level of activity are safe for you. ¨ Eat heart-healthy foods, like fruits, vegetables, and high-fiber foods. Follow-up care is a key part of your treatment and safety. Be sure to make and go to all appointments, and call your doctor if you are having problems. It's also a good idea to know your test results and keep a list of the medicines you take. Where can you learn more? Go to http://theron-lesly.info/. Enter H122 in the search box to learn more about \"Learning About Transient Ischemic Attack (TIA). \"  Current as of: March 20, 2017  Content Version: 11.4  © 0253-2761 [employer]. Care instructions adapted under license by [employer] (which disclaims liability or warranty for this information). If you have questions about a medical condition or this instruction, always ask your healthcare professional. [place] [address] any warranty or liability for your use of this information. DISCHARGE SUMMARY from Nurse    PATIENT INSTRUCTIONS:        What to do at Home:  Recommended activity: Activity as tolerated,     If you experience any of the following symptoms :  Return of similar symptoms of TIA or stroke, please go to the emergency room. *  Please give a list of your current medications to your Primary Care Provider. *  Please update this list whenever your medications are discontinued, doses are      changed, or new medications (including over-the-counter products) are added. *  Please carry medication information at all times in case of emergency situations. These are general instructions for a healthy lifestyle:    No smoking/ No tobacco products/ Avoid exposure to second hand smoke  Surgeon General's Warning:  Quitting smoking now greatly reduces serious risk to your health.     Obesity, smoking, and sedentary lifestyle greatly increases your risk for illness    A healthy diet, regular physical exercise & weight monitoring are important for maintaining a healthy lifestyle    You may be retaining fluid if you have a history of heart failure or if you experience any of the following symptoms:  Weight gain of 3 pounds or more overnight or 5 pounds in a week, increased swelling in our hands or feet or shortness of breath while lying flat in bed. Please call your doctor as soon as you notice any of these symptoms; do not wait until your next office visit. Recognize signs and symptoms of STROKE:    F-face looks uneven    A-arms unable to move or move unevenly    S-speech slurred or non-existent    T-time-call 911 as soon as signs and symptoms begin-DO NOT go       Back to bed or wait to see if you get better-TIME IS BRAIN. Warning Signs of HEART ATTACK     Call 911 if you have these symptoms:   Chest discomfort. Most heart attacks involve discomfort in the center of the chest that lasts more than a few minutes, or that goes away and comes back. It can feel like uncomfortable pressure, squeezing, fullness, or pain.  Discomfort in other areas of the upper body. Symptoms can include pain or discomfort in one or both arms, the back, neck, jaw, or stomach.  Shortness of breath with or without chest discomfort.  Other signs may include breaking out in a cold sweat, nausea, or lightheadedness. Don't wait more than five minutes to call 911 - MINUTES MATTER! Fast action can save your life. Calling 911 is almost always the fastest way to get lifesaving treatment. Emergency Medical Services staff can begin treatment when they arrive -- up to an hour sooner than if someone gets to the hospital by car. The discharge information has been reviewed with the patient. The patient verbalized understanding.   Discharge medications reviewed with the patient and appropriate educational materials and side effects teaching were provided.   ___________________________________________________________________________________________________________________________________

## 2021-02-01 NOTE — PROGRESS NOTES
Make sure Catrina has about 64 oz of water a day     We will call with changes in medication due to low heart rate     Follow up in 3 months    Patient discharged with personal belongings. Discharge paperwork given and all questions answered. Hard copy prescriptions given to patient. Walked patient to physician parking lot to personal vehicle.

## 2021-03-25 NOTE — H&P (VIEW-ONLY)
Meena Tamayo MD, [address] [facility], [address] [place] Phone [phone]   Fax [phone] Date of visit: 3/25/2021 Primary/Requesting provider: None Chief Complaint Patient presents with  New Patient  
  right inguinal hernia HISTORY OF PRESENT ILLNESS 
Kimmie Mcadams is a 72 y.o. male. HPI Patient is a 72 y.o. male who presents for evaluation of a hernia. He reports a goretex-based repair of the left side about 25 yrs ago by Dr Jose Antonio Milton, with no interval concerns. He recently developed burning in the right groin after some vigorous activities, and a few days later incidentally noted the bulge. He has had mild symptoms since; he notes these are reminiscent of his discomfort at the time of [facility] diagnosis. The bulge does spontaneously reduce and has not appreciably enlarged in the few weeks since presentation. He denies n/v, alteration of baseline bowel/bladder function. Medications:  
Current Outpatient Medications Medication Sig  
 amLODIPine (Norvasc) 5 mg tablet Take 5 mg by mouth daily.  aspirin (ASPIRIN) 325 mg tablet Take 325 mg by mouth daily.  atorvastatin (LIPITOR) 40 mg tablet Take 1 Tab by mouth nightly.  lisinopril (PRINIVIL, ZESTRIL) 40 mg tablet Take 1 Tab by mouth daily. No current facility-administered medications for this visit. Allergies: No Known Allergies Past History: 
History reviewed. No pertinent past medical history. Past Surgical History:  
Procedure Laterality Date  HX GI    
 HX ORTHOPAEDIC Family and Social History: No family history on file. Social History Socioeconomic History  Marital status:  Spouse name: Not on file  Number of children: Not on file  Years of education: Not on file  Highest education level: Not on file Occupational History  Not on file Social Needs  Financial resource strain: Not on file [employer] insecurity Worry: Not on file Inability: Not on file  Transportation needs Medical: Not on file Non-medical: Not on file Tobacco Use  Smoking status: Never Smoker  Smokeless tobacco: Never Used Substance and Sexual Activity  Alcohol use: Not on file  Drug use: Not on file  Sexual activity: Not on file Lifestyle  Physical activity Days per week: Not on file Minutes per session: Not on file  Stress: Not on file Relationships  Social connections Talks on phone: Not on file Gets together: Not on file Attends Judaism service: Not on file Active member of club or organization: Not on file Attends meetings of clubs or organizations: Not on file Relationship status: Not on file  Intimate partner violence Fear of current or ex partner: Not on file Emotionally abused: Not on file Physically abused: Not on file Forced sexual activity: Not on file Other Topics Concern  Not on file Social History Narrative  Not on file Review of Systems Genitourinary:  
     Per HPI All other systems reviewed and are negative. Physical Exam 
Vitals signs and nursing note reviewed. Constitutional:   
   Appearance: He is well-developed. He is not toxic-appearing or diaphoretic. HENT:  
   Head: Normocephalic and atraumatic. Eyes:  
   General: No scleral icterus. Conjunctiva/sclera: Conjunctivae normal.  
   Pupils: Pupils are equal, round, and reactive to light. Neck: Musculoskeletal: Normal range of motion. Thyroid: No thyromegaly. Vascular: No JVD. Trachea: No tracheal deviation. Cardiovascular:  
   Rate and Rhythm: Normal rate and regular rhythm. Heart sounds: No murmur. No friction rub. No gallop. Pulmonary:  
   Effort: Pulmonary effort is normal. No respiratory distress. Breath sounds: Normal breath sounds. No wheezing or rales.   
Abdominal:  
   General: Bowel sounds are normal.  
   Palpations: Abdomen is soft. There is no mass. Hernia: A hernia is present. Hernia is present in the right inguinal area. There is no hernia in the left inguinal area. Genitourinary: 
   Penis: Normal.   
   Testes: Normal.  
Musculoskeletal:  
   Comments: No gross deformities Skin: 
   General: Skin is warm. Neurological:  
   General: No focal deficit present. Mental Status: He is alert and oriented to person, place, and time. Cranial Nerves: No cranial nerve deficit. Psychiatric:     
   Mood and Affect: Mood normal.     
   Behavior: Behavior normal. Behavior is cooperative. Thought Content: Thought content normal.     
   Judgment: Judgment normal.  
 
 
 
ASSESSMENT and PLAN Encounter Diagnoses Name Primary?  Right inguinal hernia Yes  H/O left inguinal hernia repair Discussed risk of incarceration/strangulation event in inguinal hernias, at least those identified incidentally which may not apply here, as 1% annual risk at 5 years in recent study randomizing men to surgery or observation. The same study indicated a >60% incidence of elective conversion from observation arm to hernia repair due to symptoms by 10 years. Given above, and active lifestyle, repair is recommended and Argelia Genao agrees. Will proceed with right inguinal hernia repair with mesh. Technical details of the procedure are reviewed. Risks reviewed include risks of anesthesia, bleeding, infection, visceral injury, persistent post-operative pain, and hernia recurrence. All questions are answered.

## 2021-04-14 ENCOUNTER — ANESTHESIA EVENT (OUTPATIENT)
Dept: SURGERY | Age: 71
End: 2021-04-14
Payer: COMMERCIAL

## 2021-04-15 ENCOUNTER — HOSPITAL ENCOUNTER (OUTPATIENT)
Age: 71
Setting detail: OUTPATIENT SURGERY
Discharge: HOME | End: 2021-04-15
Attending: SURGERY | Admitting: SURGERY
Payer: COMMERCIAL

## 2021-04-15 ENCOUNTER — ANESTHESIA (OUTPATIENT)
Dept: SURGERY | Age: 71
End: 2021-04-15
Payer: COMMERCIAL

## 2021-04-15 VITALS
RESPIRATION RATE: 18 BRPM | HEIGHT: 67 IN | TEMPERATURE: 98.4 F | HEART RATE: 73 BPM | SYSTOLIC BLOOD PRESSURE: 169 MMHG | DIASTOLIC BLOOD PRESSURE: 88 MMHG | WEIGHT: 163.4 LBS | BODY MASS INDEX: 25.65 KG/M2 | OXYGEN SATURATION: 98 %

## 2021-04-15 DIAGNOSIS — K40.90 RIGHT INGUINAL HERNIA: Primary | ICD-10-CM

## 2021-04-15 PROCEDURE — 77030012411 HC DRN WND CARD -A: Performed by: SURGERY

## 2021-04-15 PROCEDURE — 74011250636 HC RX REV CODE- 250/636: Performed by: NURSE ANESTHETIST, CERTIFIED REGISTERED

## 2021-04-15 PROCEDURE — 74011000250 HC RX REV CODE- 250: Performed by: NURSE ANESTHETIST, CERTIFIED REGISTERED

## 2021-04-15 PROCEDURE — 76010000161 HC OR TIME 1 TO 1.5 HR INTENSV-TIER 1: Performed by: SURGERY

## 2021-04-15 PROCEDURE — 76210000020 HC REC RM PH II FIRST 0.5 HR: Performed by: SURGERY

## 2021-04-15 PROCEDURE — 74011250636 HC RX REV CODE- 250/636: Performed by: ANESTHESIOLOGY

## 2021-04-15 PROCEDURE — 49505 PRP I/HERN INIT REDUC >5 YR: CPT | Performed by: SURGERY

## 2021-04-15 PROCEDURE — 77030019908 HC STETH ESOPH SIMS -A: Performed by: NURSE ANESTHETIST, CERTIFIED REGISTERED

## 2021-04-15 PROCEDURE — 77030040922 HC BLNKT HYPOTHRM STRY -A: Performed by: NURSE ANESTHETIST, CERTIFIED REGISTERED

## 2021-04-15 PROCEDURE — 77030031139 HC SUT VCRL2 J&J -A: Performed by: SURGERY

## 2021-04-15 PROCEDURE — 77030002982 HC SUT POLYSRB J&J -A: Performed by: SURGERY

## 2021-04-15 PROCEDURE — C1781 MESH (IMPLANTABLE): HCPCS | Performed by: SURGERY

## 2021-04-15 PROCEDURE — 76210000006 HC OR PH I REC 0.5 TO 1 HR: Performed by: SURGERY

## 2021-04-15 PROCEDURE — 2709999900 HC NON-CHARGEABLE SUPPLY: Performed by: SURGERY

## 2021-04-15 PROCEDURE — 74011250636 HC RX REV CODE- 250/636: Performed by: SURGERY

## 2021-04-15 PROCEDURE — 77030037088 HC TUBE ENDOTRACH ORAL NSL COVD-A: Performed by: NURSE ANESTHETIST, CERTIFIED REGISTERED

## 2021-04-15 PROCEDURE — 74011250637 HC RX REV CODE- 250/637: Performed by: ANESTHESIOLOGY

## 2021-04-15 PROCEDURE — 77030002986 HC SUT PROL J&J -A: Performed by: SURGERY

## 2021-04-15 PROCEDURE — 76060000033 HC ANESTHESIA 1 TO 1.5 HR: Performed by: SURGERY

## 2021-04-15 PROCEDURE — 74011000250 HC RX REV CODE- 250: Performed by: SURGERY

## 2021-04-15 PROCEDURE — 77030040361 HC SLV COMPR DVT MDII -B: Performed by: SURGERY

## 2021-04-15 PROCEDURE — 77030002996 HC SUT SLK J&J -A: Performed by: SURGERY

## 2021-04-15 PROCEDURE — 77030039425 HC BLD LARYNG TRULITE DISP TELE -A: Performed by: NURSE ANESTHETIST, CERTIFIED REGISTERED

## 2021-04-15 DEVICE — PERFIX PLUG, 1.0" X 1.35" (2.5 CM X 3.4 CM), SMALL (CONTENTS: 2)
Type: IMPLANTABLE DEVICE | Site: GROIN | Status: FUNCTIONAL
Brand: PERFIX

## 2021-04-15 RX ORDER — LIDOCAINE HYDROCHLORIDE 20 MG/ML
INJECTION, SOLUTION EPIDURAL; INFILTRATION; INTRACAUDAL; PERINEURAL AS NEEDED
Status: DISCONTINUED | OUTPATIENT
Start: 2021-04-15 | End: 2021-04-15 | Stop reason: HOSPADM

## 2021-04-15 RX ORDER — OXYCODONE HYDROCHLORIDE 5 MG/1
5 TABLET ORAL
Status: DISCONTINUED | OUTPATIENT
Start: 2021-04-15 | End: 2021-04-15 | Stop reason: HOSPADM

## 2021-04-15 RX ORDER — FENTANYL CITRATE 50 UG/ML
INJECTION, SOLUTION INTRAMUSCULAR; INTRAVENOUS AS NEEDED
Status: DISCONTINUED | OUTPATIENT
Start: 2021-04-15 | End: 2021-04-15 | Stop reason: HOSPADM

## 2021-04-15 RX ORDER — SODIUM CHLORIDE 9 MG/ML
25 INJECTION, SOLUTION INTRAVENOUS CONTINUOUS
Status: DISCONTINUED | OUTPATIENT
Start: 2021-04-15 | End: 2021-04-15 | Stop reason: HOSPADM

## 2021-04-15 RX ORDER — DEXAMETHASONE SODIUM PHOSPHATE 4 MG/ML
INJECTION, SOLUTION INTRA-ARTICULAR; INTRALESIONAL; INTRAMUSCULAR; INTRAVENOUS; SOFT TISSUE AS NEEDED
Status: DISCONTINUED | OUTPATIENT
Start: 2021-04-15 | End: 2021-04-15 | Stop reason: HOSPADM

## 2021-04-15 RX ORDER — GLYCOPYRROLATE 0.2 MG/ML
INJECTION INTRAMUSCULAR; INTRAVENOUS AS NEEDED
Status: DISCONTINUED | OUTPATIENT
Start: 2021-04-15 | End: 2021-04-15 | Stop reason: HOSPADM

## 2021-04-15 RX ORDER — SODIUM CHLORIDE 0.9 % (FLUSH) 0.9 %
5-40 SYRINGE (ML) INJECTION EVERY 8 HOURS
Status: DISCONTINUED | OUTPATIENT
Start: 2021-04-15 | End: 2021-04-15 | Stop reason: HOSPADM

## 2021-04-15 RX ORDER — ROCURONIUM BROMIDE 10 MG/ML
INJECTION, SOLUTION INTRAVENOUS AS NEEDED
Status: DISCONTINUED | OUTPATIENT
Start: 2021-04-15 | End: 2021-04-15 | Stop reason: HOSPADM

## 2021-04-15 RX ORDER — SODIUM CHLORIDE, SODIUM LACTATE, POTASSIUM CHLORIDE, CALCIUM CHLORIDE 600; 310; 30; 20 MG/100ML; MG/100ML; MG/100ML; MG/100ML
100 INJECTION, SOLUTION INTRAVENOUS CONTINUOUS
Status: DISCONTINUED | OUTPATIENT
Start: 2021-04-15 | End: 2021-04-15 | Stop reason: HOSPADM

## 2021-04-15 RX ORDER — CEFAZOLIN SODIUM 1 G/3ML
2-3 INJECTION, POWDER, FOR SOLUTION INTRAMUSCULAR; INTRAVENOUS
Status: DISCONTINUED | OUTPATIENT
Start: 2021-04-15 | End: 2021-04-15

## 2021-04-15 RX ORDER — MIDAZOLAM HYDROCHLORIDE 1 MG/ML
2 INJECTION, SOLUTION INTRAMUSCULAR; INTRAVENOUS
Status: DISCONTINUED | OUTPATIENT
Start: 2021-04-15 | End: 2021-04-15 | Stop reason: HOSPADM

## 2021-04-15 RX ORDER — KETOROLAC TROMETHAMINE 30 MG/ML
INJECTION, SOLUTION INTRAMUSCULAR; INTRAVENOUS AS NEEDED
Status: DISCONTINUED | OUTPATIENT
Start: 2021-04-15 | End: 2021-04-15 | Stop reason: HOSPADM

## 2021-04-15 RX ORDER — FAMOTIDINE 20 MG/1
20 TABLET, FILM COATED ORAL ONCE
Status: COMPLETED | OUTPATIENT
Start: 2021-04-15 | End: 2021-04-15

## 2021-04-15 RX ORDER — SODIUM CHLORIDE 0.9 % (FLUSH) 0.9 %
5-40 SYRINGE (ML) INJECTION AS NEEDED
Status: DISCONTINUED | OUTPATIENT
Start: 2021-04-15 | End: 2021-04-15 | Stop reason: HOSPADM

## 2021-04-15 RX ORDER — HYDROCODONE BITARTRATE AND ACETAMINOPHEN 7.5; 325 MG/1; MG/1
1 TABLET ORAL AS NEEDED
Status: DISCONTINUED | OUTPATIENT
Start: 2021-04-15 | End: 2021-04-15 | Stop reason: HOSPADM

## 2021-04-15 RX ORDER — PROPOFOL 10 MG/ML
INJECTION, EMULSION INTRAVENOUS AS NEEDED
Status: DISCONTINUED | OUTPATIENT
Start: 2021-04-15 | End: 2021-04-15 | Stop reason: HOSPADM

## 2021-04-15 RX ORDER — ONDANSETRON 2 MG/ML
INJECTION INTRAMUSCULAR; INTRAVENOUS AS NEEDED
Status: DISCONTINUED | OUTPATIENT
Start: 2021-04-15 | End: 2021-04-15 | Stop reason: HOSPADM

## 2021-04-15 RX ORDER — FENTANYL CITRATE 50 UG/ML
100 INJECTION, SOLUTION INTRAMUSCULAR; INTRAVENOUS ONCE
Status: DISCONTINUED | OUTPATIENT
Start: 2021-04-15 | End: 2021-04-15 | Stop reason: HOSPADM

## 2021-04-15 RX ORDER — LIDOCAINE HYDROCHLORIDE 10 MG/ML
0.1 INJECTION INFILTRATION; PERINEURAL AS NEEDED
Status: DISCONTINUED | OUTPATIENT
Start: 2021-04-15 | End: 2021-04-15 | Stop reason: HOSPADM

## 2021-04-15 RX ORDER — BUPIVACAINE HYDROCHLORIDE AND EPINEPHRINE 5; 5 MG/ML; UG/ML
INJECTION, SOLUTION EPIDURAL; INTRACAUDAL; PERINEURAL AS NEEDED
Status: DISCONTINUED | OUTPATIENT
Start: 2021-04-15 | End: 2021-04-15 | Stop reason: HOSPADM

## 2021-04-15 RX ORDER — CEFAZOLIN SODIUM/WATER 2 G/20 ML
2 SYRINGE (ML) INTRAVENOUS
Status: COMPLETED | OUTPATIENT
Start: 2021-04-15 | End: 2021-04-15

## 2021-04-15 RX ORDER — NALOXONE HYDROCHLORIDE 0.4 MG/ML
0.1 INJECTION, SOLUTION INTRAMUSCULAR; INTRAVENOUS; SUBCUTANEOUS AS NEEDED
Status: DISCONTINUED | OUTPATIENT
Start: 2021-04-15 | End: 2021-04-15 | Stop reason: HOSPADM

## 2021-04-15 RX ORDER — HYDROMORPHONE HYDROCHLORIDE 1 MG/ML
0.5 INJECTION, SOLUTION INTRAMUSCULAR; INTRAVENOUS; SUBCUTANEOUS
Status: DISCONTINUED | OUTPATIENT
Start: 2021-04-15 | End: 2021-04-15 | Stop reason: HOSPADM

## 2021-04-15 RX ORDER — HYDROCODONE BITARTRATE AND ACETAMINOPHEN 5; 325 MG/1; MG/1
TABLET ORAL
Qty: 20 TAB | Refills: 0 | Status: SHIPPED | OUTPATIENT
Start: 2021-04-15 | End: 2021-04-22

## 2021-04-15 RX ADMIN — PROPOFOL 30 MG: 10 INJECTION, EMULSION INTRAVENOUS at 13:55

## 2021-04-15 RX ADMIN — SODIUM CHLORIDE, SODIUM LACTATE, POTASSIUM CHLORIDE, AND CALCIUM CHLORIDE 100 ML/HR: 600; 310; 30; 20 INJECTION, SOLUTION INTRAVENOUS at 11:26

## 2021-04-15 RX ADMIN — CEFAZOLIN 2 G: 1 INJECTION, POWDER, FOR SOLUTION INTRAVENOUS at 13:18

## 2021-04-15 RX ADMIN — FENTANYL CITRATE 100 MCG: 50 INJECTION INTRAMUSCULAR; INTRAVENOUS at 13:21

## 2021-04-15 RX ADMIN — LIDOCAINE HYDROCHLORIDE 80 MG: 20 INJECTION, SOLUTION EPIDURAL; INFILTRATION; INTRACAUDAL; PERINEURAL at 13:21

## 2021-04-15 RX ADMIN — SUGAMMADEX 200 MG: 100 INJECTION, SOLUTION INTRAVENOUS at 14:11

## 2021-04-15 RX ADMIN — PROPOFOL 200 MG: 10 INJECTION, EMULSION INTRAVENOUS at 13:21

## 2021-04-15 RX ADMIN — KETOROLAC TROMETHAMINE 15 MG: 30 INJECTION, SOLUTION INTRAMUSCULAR at 14:11

## 2021-04-15 RX ADMIN — ROCURONIUM BROMIDE 40 MG: 10 INJECTION, SOLUTION INTRAVENOUS at 13:21

## 2021-04-15 RX ADMIN — FAMOTIDINE 20 MG: 20 TABLET ORAL at 11:26

## 2021-04-15 RX ADMIN — GLYCOPYRROLATE 0.2 MG: 0.2 INJECTION, SOLUTION INTRAMUSCULAR; INTRAVENOUS at 13:47

## 2021-04-15 RX ADMIN — LIDOCAINE HYDROCHLORIDE 20 MG: 20 INJECTION, SOLUTION EPIDURAL; INFILTRATION; INTRACAUDAL; PERINEURAL at 13:36

## 2021-04-15 RX ADMIN — DEXAMETHASONE SODIUM PHOSPHATE 4 MG: 4 INJECTION, SOLUTION INTRAMUSCULAR; INTRAVENOUS at 13:32

## 2021-04-15 RX ADMIN — ONDANSETRON 4 MG: 2 INJECTION INTRAMUSCULAR; INTRAVENOUS at 13:32

## 2021-04-15 NOTE — ANESTHESIA PREPROCEDURE EVALUATION
Relevant Problems   No relevant active problems       Anesthetic History               Review of Systems / Medical History  Patient summary reviewed    Pulmonary                   Neuro/Psych         TIA     Cardiovascular    Hypertension                   GI/Hepatic/Renal                Endo/Other             Other Findings              Physical Exam    Airway  Mallampati: II  TM Distance: > 6 cm  Neck ROM: normal range of motion   Mouth opening: Normal     Cardiovascular  Regular rate and rhythm,  S1 and S2 normal,  no murmur, click, rub, or gallop             Dental  No notable dental hx       Pulmonary  Breath sounds clear to auscultation               Abdominal         Other Findings            Anesthetic Plan    ASA: 2  Anesthesia type: general            Anesthetic plan and risks discussed with: Patient

## 2021-04-15 NOTE — INTERVAL H&P NOTE
Update History & Physical 
 
The Patient's History and Physical was reviewed with the patient and I examined the patient. There was no change. The surgical site was confirmed by the patient and me. Plan:  The risk, benefits, expected outcome, and alternative to the recommended procedure have been discussed with the patient. Patient understands and wants to proceed with the procedure.  
 
Electronically signed by Bola Rai MD on 4/15/2021 at 1:05 PM

## 2021-04-15 NOTE — OP NOTES
Operative Report    Patient: Lei Bolton. MRN: [id number]     YOB: 1950  Age: 72 y.o. Sex: male       Date of Surgery: 4/15/2021     Preoperative Diagnosis: Right inguinal hernia [K40.90]     Postoperative Diagnosis: Right inguinal hernia [K40.90]     Procedure:  right Indirect Inguinal Hernia Repair with PerFix Plug     Anesthesia: General     EBL: minimal    Complications: none    Indications:  As outlined in History and Physical.    Procedure Details   Informed consent was obtained and the patient was brought to the operating room and placed on the table in a supine position. After successful induction of anesthesia, the groin was prepped and draped in the standard fashion. An incision was made in the suprainguinal region and was carried down through the subcutaneous tissues with cautery to the external oblique aponeurosis which was incised parallel to its fibers including opening through the external ring. one nerve(s) was/were identified on the floor of the canal and were avoided throughout the procedure. The cord structures were mobilized at the level of the pubic tubercle and isolated with a penrose drain. The cord was then dissected in its proximal third and an indirect sac was encountered; a cord lipoma was not identified. The small, and presently empty, sac was dissected from the surrounding cord structures with cautery, tracing it through and then reducing it through the defect into the preperitoneal space taking care to avoid injury to the vas deferens. A small PerFix plug was placed into the preperitoneal space through the internal ring and the inner petals of the plug were sutured to the surrounding tissue with 2-0 Prolene; this included the conjoined tendon above and  the internal oblique muscle laterally. The direct space was examined and there was not  a defect identified.  The patch was then placed on the floor of the inguinal canal and sutured in place with 2-0 Prolene to the pubic tubercle medially, the inguinal ligament inferiorly, the rectus fascia superiorly, and laterally the upper leaf was crossed over the lower leaf and sutured to the inguinal ligament to provide a valve-like reconstruction of the internal ring. The cord was then returned to its normal anatomic position. The wound was irrigated, made hemostatic as necessary with cautery, and infiltrated with local.  The external oblique was then reapproximated with 2-0 vicryl to recreate the external ring, Lizette's fascia was closed with 3-0 Vicryl  and the skin with subcuticular 4-0 Vicryl. Steri-Strips, telfa, and a tegaderm dressing was applied. The patient tolerated the procedure well and was awakened from anesthesia and taken to recovery in satisfactory condition. Sponge, needle, and instrument counts were correct as reported to me.     Sally Rendon MD  April 15, 2021

## 2021-04-15 NOTE — ANESTHESIA POSTPROCEDURE EVALUATION
Procedure(s):  RIGHT OPEN HERNIA INGUINAL REPAIR W/ MESH.     general    Anesthesia Post Evaluation      Multimodal analgesia: multimodal analgesia used between 6 hours prior to anesthesia start to PACU discharge  Patient location during evaluation: PACU  Patient participation: complete - patient participated  Level of consciousness: awake and alert  Pain management: adequate  Airway patency: patent  Anesthetic complications: no  Cardiovascular status: acceptable  Respiratory status: acceptable  Hydration status: acceptable  Post anesthesia nausea and vomiting:  none  Final Post Anesthesia Temperature Assessment:  Normothermia (36.0-37.5 degrees C)      INITIAL Post-op Vital signs:   Vitals Value Taken Time   /94 04/15/21 1501   Temp 36.9 °C (98.4 °F) 04/15/21 1501   Pulse 79 04/15/21 1501   Resp 18 04/15/21 1501   SpO2 96 % 04/15/21 1501

## 2021-04-15 NOTE — DISCHARGE INSTRUCTIONS
Liset Salomon M.D.  [phone]    Instructions following Hernia Repair:    ACTIVITY:   Try to take a few short walks with help around the house later today. It is very important to take short walks to avoid blood clots and pneumonia.  You may be light-headed or sleepy from anesthesia, so be careful going up and down stairs. Avoid any activity that involves lifting/pushing more than 30 pounds until your followup appointment  DIET:   Drink only clear, non-carbonated liquids when you first get home (sugar-free if you are diabetic), such as Gatorade, chicken broth, etc.   Later  you may resume a more normal diet, depending on how you feel   Using Miralax or other over the counter laxative is ok if you develop constipation    PAIN:   You will be given a prescription for pain medication.  Try to take the pain medication with food, even a few crackers.  You may also use Tylenol, Motrin, Advil, or Aleve instead of the prescription pain medication. Do no take Tylenol and the prescription pain medication within 6 hours of each other.  URINARY RETENTION: If you are unable to empty your bladder within 6 hours after returning home, please go to your nearest Emergency Department or Urgent Care for urinary catheterization. WOUND CARE:   You may shower the day after surgery, unless instructed otherwise.  It is not uncommon for the incisions to ooze or drain blood-tinged fluid. You may remove the clear dressing on the fifth postoperative day.  Incisions will sometimes develop redness around them, up to the size of a quarter, as well as a hard lumpy feel. If this redness continues to get larger, please call the office. FOLLOW UP:   Your follow-up appointment is usually made when your surgery is arranged. Please call the office if you are not sure of this appointment. CALL THE DOCTOR IF:   You have a temperature higher than 101.5° Fahrenheit for more than 6 hours.    You have severe nausea or vomiting.  You develop increasing redness or infection at the incision. Continue home medications as previously prescribed. After general anesthesia or intravenous sedation, for 24 hours or while taking prescription Narcotics:  · Limit your activities  · A responsible adult needs to be with you for the next 24 hours  · Do not drive and operate hazardous machinery  · Do not make important personal or business decisions  · Do not drink alcoholic beverages  · If you have not urinated within 8 hours after discharge, and you are experiencing discomfort from urinary retention, please go to the nearest ED. · If you have sleep apnea and have a CPAP machine, please use it for all naps and sleeping. · Please use caution when taking narcotics and any of your home medications that may cause drowsiness. *  Please give a list of your current medications to your Primary Care Provider. *  Please update this list whenever your medications are discontinued, doses are      changed, or new medications (including over-the-counter products) are added. *  Please carry medication information at all times in case of emergency situations. These are general instructions for a healthy lifestyle:  No smoking/ No tobacco products/ Avoid exposure to second hand smoke  Surgeon General's Warning:  Quitting smoking now greatly reduces serious risk to your health. Obesity, smoking, and sedentary lifestyle greatly increases your risk for illness  A healthy diet, regular physical exercise & weight monitoring are important for maintaining a healthy lifestyle    You may be retaining fluid if you have a history of heart failure or if you experience any of the following symptoms:  Weight gain of 3 pounds or more overnight or 5 pounds in a week, increased swelling in our hands or feet or shortness of breath while lying flat in bed.   Please call your doctor as soon as you notice any of these symptoms; do not wait until your next office visit.

## 2021-12-09 PROBLEM — I48.0 PAROXYSMAL ATRIAL FIBRILLATION (HCC): Status: ACTIVE | Noted: 2021-12-09

## 2021-12-16 ENCOUNTER — APPOINTMENT (OUTPATIENT)
Dept: CARDIAC CATH/INVASIVE PROCEDURES | Age: 71
End: 2021-12-16
Attending: INTERNAL MEDICINE
Payer: COMMERCIAL

## 2021-12-16 ENCOUNTER — HOSPITAL ENCOUNTER (OUTPATIENT)
Dept: CARDIAC CATH/INVASIVE PROCEDURES | Age: 71
Discharge: HOME | End: 2021-12-16
Attending: INTERNAL MEDICINE | Admitting: INTERNAL MEDICINE
Payer: COMMERCIAL

## 2021-12-16 VITALS — SYSTOLIC BLOOD PRESSURE: 125 MMHG | HEART RATE: 54 BPM | DIASTOLIC BLOOD PRESSURE: 83 MMHG | OXYGEN SATURATION: 98 %

## 2021-12-16 DIAGNOSIS — R06.09 DOE (DYSPNEA ON EXERTION): ICD-10-CM

## 2021-12-16 DIAGNOSIS — I48.91 ATRIAL FIBRILLATION, UNSPECIFIED TYPE (HCC): ICD-10-CM

## 2021-12-16 DIAGNOSIS — I48.0 PAROXYSMAL ATRIAL FIBRILLATION (HCC): ICD-10-CM

## 2021-12-16 DIAGNOSIS — R53.82 CHRONIC FATIGUE: ICD-10-CM

## 2021-12-16 LAB
ANION GAP SERPL CALC-SCNC: 3 MMOL/L (ref 7–16)
ATRIAL RATE: 468 BPM
BUN SERPL-MCNC: 18 MG/DL (ref 8–23)
CALCIUM SERPL-MCNC: 8.9 MG/DL (ref 8.3–10.4)
CALCULATED R AXIS, ECG10: 60 DEGREES
CALCULATED T AXIS, ECG11: 68 DEGREES
CHLORIDE SERPL-SCNC: 108 MMOL/L (ref 98–107)
CO2 SERPL-SCNC: 32 MMOL/L (ref 21–32)
CREAT SERPL-MCNC: 1.16 MG/DL (ref 0.8–1.5)
DIAGNOSIS, 93000: NORMAL
ERYTHROCYTE [DISTWIDTH] IN BLOOD BY AUTOMATED COUNT: 13.2 % (ref 11.9–14.6)
GLUCOSE SERPL-MCNC: 98 MG/DL (ref 65–100)
HCT VFR BLD AUTO: 47.7 % (ref 41.1–50.3)
HGB BLD-MCNC: 16.2 G/DL (ref 13.6–17.2)
MAGNESIUM SERPL-MCNC: 2.2 MG/DL (ref 1.8–2.4)
MCH RBC QN AUTO: 29.9 PG (ref 26.1–32.9)
MCHC RBC AUTO-ENTMCNC: 34 G/DL (ref 31.4–35)
MCV RBC AUTO: 88.2 FL (ref 79.6–97.8)
NRBC # BLD: 0 K/UL (ref 0–0.2)
PLATELET # BLD AUTO: 194 K/UL (ref 150–450)
PMV BLD AUTO: 9.7 FL (ref 9.4–12.3)
POTASSIUM SERPL-SCNC: 3.7 MMOL/L (ref 3.5–5.1)
Q-T INTERVAL, ECG07: 412 MS
QRS DURATION, ECG06: 90 MS
QTC CALCULATION (BEZET), ECG08: 453 MS
RBC # BLD AUTO: 5.41 M/UL (ref 4.23–5.6)
SODIUM SERPL-SCNC: 143 MMOL/L (ref 138–145)
VENTRICULAR RATE, ECG03: 73 BPM
WBC # BLD AUTO: 6.2 K/UL (ref 4.3–11.1)

## 2021-12-16 PROCEDURE — 99152 MOD SED SAME PHYS/QHP 5/>YRS: CPT

## 2021-12-16 PROCEDURE — 93320 DOPPLER ECHO COMPLETE: CPT | Performed by: INTERNAL MEDICINE

## 2021-12-16 PROCEDURE — 92960 CARDIOVERSION ELECTRIC EXT: CPT

## 2021-12-16 PROCEDURE — 92960 CARDIOVERSION ELECTRIC EXT: CPT | Performed by: INTERNAL MEDICINE

## 2021-12-16 PROCEDURE — 93325 DOPPLER ECHO COLOR FLOW MAPG: CPT | Performed by: INTERNAL MEDICINE

## 2021-12-16 PROCEDURE — 83735 ASSAY OF MAGNESIUM: CPT

## 2021-12-16 PROCEDURE — 93320 DOPPLER ECHO COMPLETE: CPT

## 2021-12-16 PROCEDURE — 93312 ECHO TRANSESOPHAGEAL: CPT | Performed by: INTERNAL MEDICINE

## 2021-12-16 PROCEDURE — 85027 COMPLETE CBC AUTOMATED: CPT

## 2021-12-16 PROCEDURE — 74011250636 HC RX REV CODE- 250/636: Performed by: INTERNAL MEDICINE

## 2021-12-16 PROCEDURE — 99152 MOD SED SAME PHYS/QHP 5/>YRS: CPT | Performed by: INTERNAL MEDICINE

## 2021-12-16 PROCEDURE — 93312 ECHO TRANSESOPHAGEAL: CPT

## 2021-12-16 PROCEDURE — 80048 BASIC METABOLIC PNL TOTAL CA: CPT

## 2021-12-16 PROCEDURE — 93005 ELECTROCARDIOGRAM TRACING: CPT | Performed by: INTERNAL MEDICINE

## 2021-12-16 PROCEDURE — 76377 3D RENDER W/INTRP POSTPROCES: CPT | Performed by: INTERNAL MEDICINE

## 2021-12-16 RX ORDER — SODIUM CHLORIDE 9 MG/ML
75 INJECTION, SOLUTION INTRAVENOUS CONTINUOUS
Status: DISCONTINUED | OUTPATIENT
Start: 2021-12-16 | End: 2021-12-16 | Stop reason: HOSPADM

## 2021-12-16 RX ORDER — LIDOCAINE HYDROCHLORIDE 20 MG/ML
15 SOLUTION OROPHARYNGEAL AS NEEDED
Status: DISCONTINUED | OUTPATIENT
Start: 2021-12-16 | End: 2021-12-16 | Stop reason: HOSPADM

## 2021-12-16 RX ORDER — FENTANYL CITRATE 50 UG/ML
25-50 INJECTION, SOLUTION INTRAMUSCULAR; INTRAVENOUS
Status: DISCONTINUED | OUTPATIENT
Start: 2021-12-16 | End: 2021-12-16 | Stop reason: HOSPADM

## 2021-12-16 RX ORDER — MIDAZOLAM HYDROCHLORIDE 1 MG/ML
1-2 INJECTION, SOLUTION INTRAMUSCULAR; INTRAVENOUS AS NEEDED
Status: DISCONTINUED | OUTPATIENT
Start: 2021-12-16 | End: 2021-12-16 | Stop reason: HOSPADM

## 2021-12-16 RX ADMIN — FENTANYL CITRATE 25 MCG: 50 INJECTION, SOLUTION INTRAMUSCULAR; INTRAVENOUS at 12:25

## 2021-12-16 RX ADMIN — MIDAZOLAM HYDROCHLORIDE 2 MG: 1 INJECTION, SOLUTION INTRAMUSCULAR; INTRAVENOUS at 12:24

## 2021-12-16 RX ADMIN — MIDAZOLAM HYDROCHLORIDE 1 MG: 1 INJECTION, SOLUTION INTRAMUSCULAR; INTRAVENOUS at 12:25

## 2021-12-16 RX ADMIN — MIDAZOLAM HYDROCHLORIDE 1 MG: 1 INJECTION, SOLUTION INTRAMUSCULAR; INTRAVENOUS at 12:33

## 2021-12-16 RX ADMIN — FENTANYL CITRATE 50 MCG: 50 INJECTION, SOLUTION INTRAMUSCULAR; INTRAVENOUS at 12:24

## 2021-12-16 NOTE — DISCHARGE INSTRUCTIONS
Patient Education        Electrical Cardioversion: What to Expect at Home  Your Recovery     Electrical cardioversion is a treatment for an abnormal heartbeat, such as atrial fibrillation, supraventricular tachycardia, or ventricular tachycardia (VT). Your doctor used a brief electrical shock to reset your heart's rhythm. After the procedure, you may have redness, like a sunburn, where the patches were. The medicines you got to make you sleepy may make you feel drowsy for the rest of the day. Your doctor may have you take medicines to help the heart beat normally and to prevent blood clots. This care sheet gives you a general idea about how long it will take for you to recover. But each person recovers at a different pace. Follow the steps below to feel better as quickly as possible. How can you care for yourself at home? Medicines    · Be safe with medicines. Take your medicines exactly as prescribed. Call your doctor if you think you are having a problem with your medicine. You may take one or more of the following medicines:  ? Rate-control medicines to slow the heart rate. These include beta-blockers, calcium channel blockers, and digoxin. ? Rhythm control medicines that help the heart keep a normal rhythm. ? Blood thinners, also called anticoagulants, which help prevent blood clots. You will get more details on the specific medicines your doctor prescribes. Be sure you know how to take your medicines safely.     · Do not take any vitamins, over-the-counter medicines, or herbal products without talking to your doctor first.   Exercise    · Start light exercise if your doctor says that it's okay. Even a small amount will help you get stronger, have more energy, and manage your stress. Walking is an easy way to get exercise. Start out by walking a little more than you did in the hospital. Bit by bit, increase the amount you walk.     · When you exercise, watch for signs that your heart is working too hard. You are pushing too hard if you cannot talk while you are exercising. If you become short of breath or dizzy or have chest pain, sit down and rest right away.     · Check your pulse regularly. Place two fingers on the artery at the palm side of your wrist in line with your thumb. If your heartbeat seems uneven or fast, talk to your doctor. Other instructions    · Ask your doctor when you can drive again.     · Do not smoke. If you need help quitting, talk to your doctor about stop-smoking programs and medicines. These can increase your chances of quitting for good.     · Limit alcohol. Follow-up care is a key part of your treatment and safety. Be sure to make and go to all appointments, and call your doctor if you are having problems. It's also a good idea to know your test results and keep a list of the medicines you take. When should you call for help? Call 911 anytime you think you may need emergency care. For example, call if:    · You passed out (lost consciousness).     · You have chest pain or pressure. This may occur with:  ? Sweating. ? Shortness of breath. ? Nausea or vomiting. ? Pain that spreads from the chest to the neck, jaw, or one or both shoulders or arms. ? A fast or uneven pulse. After calling 911, the  may tell you to chew 1 adult-strength or 2 to 4 low-dose aspirin. Wait for an ambulance. Do not try to drive yourself.     · You have symptoms of a stroke. These may include:  ? Sudden numbness, tingling, weakness, or loss of movement in your face, arm, or leg, especially on only one side of your body. ? Sudden vision changes. ? Sudden trouble speaking. ? Sudden confusion or trouble understanding simple statements. ? Sudden problems with walking or balance. ? A sudden, severe headache that is different from past headaches.    Call your doctor now or seek immediate medical care if:    · You feel dizzy or lightheaded, or you feel like you may faint.     · You have a fast or irregular heartbeat. Watch closely for any changes in your health, and be sure to contact your doctor if you have any problems. AFTER YOU TRANSESOPHAGEAL ECHOCARDIOGRAM    Be sure someone else drives you home. You may feel drowsy for several hours. Do not eat or drink for at least two hours after your procedure. Your throat will be numb and there is a risk you might have difficulty swallowing for a while. Be careful when you do eat or drink for the first time especially with hot fluids since you could easily burn your throat. Call your doctor if:    · You are bleeding from your throat or mouth. · You have trouble breathing all of a sudden. · You have chest pain or any pain that spreads to your neck, jaw, or arms. · You have questions or concerns. · You have a fever greater than 101°F.    Doctor: [facility] at [phone]    Special Instructions:    No driving for 24 hours. Nothing to eat/drink until 5:00 this afternoon. Start with sips of something cool, if tolerated you may progress to solid foods. If you start to cough/choke, STOP eating drinking, wait 30 minutes and then try again. Where can you learn more? Go to http://www.gray.com/  Enter A617 in the search box to learn more about \"Electrical Cardioversion: What to Expect at Home. \"  Current as of: April 29, 2021               Content Version: 13.0  © 3619-4789 [employer], [employer]. Care instructions adapted under license by [employer] (which disclaims liability or warranty for this information). If you have questions about a medical condition or this instruction, always ask your healthcare professional. [place] [address] any warranty or liability for your use of this information. denies pain/discomfort

## 2021-12-16 NOTE — PROGRESS NOTES
Discharge instructions reviewed with patient including post PRISCILA CVN care. INT removed with cath intact. Follow up appointment made.

## 2022-03-18 PROBLEM — Z86.79 HX OF SINUS BRADYCARDIA: Status: ACTIVE | Noted: 2018-03-02

## 2022-03-18 PROBLEM — G45.9 TIA (TRANSIENT ISCHEMIC ATTACK): Status: ACTIVE | Noted: 2018-03-01

## 2022-03-19 PROBLEM — I48.0 PAROXYSMAL ATRIAL FIBRILLATION (HCC): Status: ACTIVE | Noted: 2021-12-09

## 2022-03-20 PROBLEM — I16.1 HYPERTENSIVE EMERGENCY: Status: ACTIVE | Noted: 2018-03-01

## 2022-03-24 ENCOUNTER — HOSPITAL ENCOUNTER (OUTPATIENT)
Dept: WOUND CARE | Age: 72
Discharge: HOME | End: 2022-03-24
Attending: SURGERY
Payer: COMMERCIAL

## 2022-03-24 VITALS
RESPIRATION RATE: 18 BRPM | HEIGHT: 67 IN | HEART RATE: 68 BPM | DIASTOLIC BLOOD PRESSURE: 86 MMHG | OXYGEN SATURATION: 99 % | BODY MASS INDEX: 25.27 KG/M2 | WEIGHT: 161 LBS | TEMPERATURE: 97.7 F | SYSTOLIC BLOOD PRESSURE: 157 MMHG

## 2022-03-24 DIAGNOSIS — S81.802A TRAUMATIC OPEN WOUND OF LEFT LOWER LEG, INITIAL ENCOUNTER: Primary | ICD-10-CM

## 2022-03-24 PROCEDURE — 11042 DBRDMT SUBQ TIS 1ST 20SQCM/<: CPT | Performed by: SURGERY

## 2022-03-24 PROCEDURE — 11042 DBRDMT SUBQ TIS 1ST 20SQCM/<: CPT

## 2022-03-24 PROCEDURE — 99203 OFFICE O/P NEW LOW 30 MIN: CPT | Performed by: SURGERY

## 2022-03-24 PROCEDURE — 99213 OFFICE O/P EST LOW 20 MIN: CPT

## 2022-03-24 RX ORDER — LIDOCAINE 40 MG/G
CREAM TOPICAL ONCE
Status: CANCELLED | OUTPATIENT
Start: 2022-03-24 | End: 2022-03-24

## 2022-03-24 RX ORDER — LIDOCAINE 50 MG/G
OINTMENT TOPICAL ONCE
Status: CANCELLED | OUTPATIENT
Start: 2022-03-24 | End: 2022-03-24

## 2022-03-24 RX ORDER — LIDOCAINE HYDROCHLORIDE 20 MG/ML
5 SOLUTION OROPHARYNGEAL ONCE
Status: CANCELLED | OUTPATIENT
Start: 2022-03-24 | End: 2022-03-24

## 2022-03-24 RX ORDER — LIDOCAINE HYDROCHLORIDE 20 MG/ML
JELLY TOPICAL ONCE
Status: CANCELLED | OUTPATIENT
Start: 2022-03-24 | End: 2022-03-24

## 2022-03-24 RX ORDER — BETAMETHASONE DIPROPIONATE 0.5 MG/G
OINTMENT TOPICAL ONCE
Status: CANCELLED | OUTPATIENT
Start: 2022-03-24 | End: 2022-03-24

## 2022-03-24 RX ORDER — LIDOCAINE HYDROCHLORIDE 40 MG/ML
SOLUTION TOPICAL ONCE
Status: CANCELLED | OUTPATIENT
Start: 2022-03-24 | End: 2022-03-24

## 2022-03-24 RX ORDER — SILVER SULFADIAZINE 10 G/1000G
CREAM TOPICAL ONCE
Status: CANCELLED | OUTPATIENT
Start: 2022-03-24 | End: 2022-03-24

## 2022-03-24 RX ORDER — GENTAMICIN SULFATE 1 MG/G
OINTMENT TOPICAL ONCE
Status: CANCELLED | OUTPATIENT
Start: 2022-03-24 | End: 2022-03-24

## 2022-03-24 RX ORDER — CLOBETASOL PROPIONATE 0.5 MG/G
OINTMENT TOPICAL ONCE
Status: CANCELLED | OUTPATIENT
Start: 2022-03-24 | End: 2022-03-24

## 2022-03-24 RX ORDER — BACITRACIN ZINC AND POLYMYXIN B SULFATE 500; 1000 [USP'U]/G; [USP'U]/G
OINTMENT TOPICAL ONCE
Status: CANCELLED | OUTPATIENT
Start: 2022-03-24 | End: 2022-03-24

## 2022-03-24 RX ORDER — MUPIROCIN 20 MG/G
OINTMENT TOPICAL ONCE
Status: CANCELLED | OUTPATIENT
Start: 2022-03-24 | End: 2022-03-24

## 2022-03-24 RX ORDER — BACITRACIN 500 [USP'U]/G
OINTMENT TOPICAL ONCE
Status: CANCELLED | OUTPATIENT
Start: 2022-03-24 | End: 2022-03-24

## 2022-03-24 RX ORDER — TRIAMCINOLONE ACETONIDE 1 MG/G
OINTMENT TOPICAL ONCE
Status: CANCELLED | OUTPATIENT
Start: 2022-03-24 | End: 2022-03-24

## 2022-03-24 NOTE — WOUND CARE
[address]  [address], [address]   Phone: [phone]  Fax: [phone]    Patient: Jerzy Montiel. MRN: [id number]  SSN: [id number]    YOB: 1950  Age: 80 y.o. Sex: male       Return Appointment: 1 week on Wednesday with Dr. Demetri Valencia    Instructions:   Left Medial Ankle:  Cleanse wound with normal saline. DO NOT GET WET IN SHOWER. Apply Dakin's solution 0.25%-apply to wound on gauze or packing strip. Cover with ABD/ Optilock pad/gauze 4x4. Cover with rolled gauze and tubigrip stocking. Change daily. *Eat high protein diet and drink Robbie packet once a day to aid in wound healing. Should you experience increased redness, swelling, pain, foul odor, size of wound(s), or have a temperature over 101 degrees please contact the [address] [place] [address] Road at [phone] or if after hours contact your primary care physician or go to the hospital emergency department.     Signed By: Kena Humphries RN     March 24, 2022

## 2022-03-24 NOTE — DISCHARGE INSTRUCTIONS
Left Medial Ankle:  Cleanse wound with normal saline. DO NOT GET WET IN SHOWER. Apply Dakin's solution 0.25%-apply to wound on gauze or packing strip. Cover with ABD/ Optilock pad/gauze 4x4. Cover with Covrsite or rolled gauze. Change daily.

## 2022-03-24 NOTE — WOUND CARE
03/24/22 1433   Wound Ankle Left;Medial   Date First Assessed/Time First Assessed: 03/24/22 1432   Present on Hospital Admission: Yes  Wound Approximate Age at First Assessment (Weeks): 5 weeks  Primary Wound Type: Traumatic  Location: Ankle  Wound Location Orientation: Left;Medial   Wound Image    Wound Etiology Traumatic   Dressing Status Breakthrough drainage noted; Old drainage noted   Cleansed Cleansed with saline   Dressing/Treatment Hydrocolloid   Wound Length (cm) 3.5 cm   Wound Width (cm) 4 cm   Wound Depth (cm) 1 cm   Wound Surface Area (cm^2) 14 cm^2   Wound Volume (cm^3) 14 cm^3   Wound Assessment Eschar moist;Granulation tissue;Slough   Drainage Amount Large   Drainage Description Thick;Sanguineous   Wound Odor Mild   Reanna-Wound/Incision Assessment Blanchable erythema;Edematous; Warm   Wound Thickness Description Full thickness     Undermines from 4 o'clock to 7 o'clock 2cm deepest.     Patient is taking Eliquis and ASA daily.

## 2022-03-24 NOTE — PROGRESS NOTES
[address], [address], [address]  [phone]    Progress Notes   [place] MRN: [id number]     : 1950     Age: 80 y.o. Subjective/HPI:   This patient is a 80 y.o. male seen and evaluated at the wound clinic for initial evaluation of a traumatic wound of the left leg that occurred 3 weeks ago. Patient reports that her trailer hitch scraped his leg and he was left with a large open wound just above his ankle. Initially it was quite painful however since then the pain has improved. He only has mild discomfort when walking. He is also had some surrounding redness but it has also been slowly improving. He has not noted any purulent drainage nor has he had any fever. The patient has been treating the wound himself with a colloid dressing. Review of Systems  A comprehensive review of systems was negative except for that written in the HPI. Past Medical History:   Diagnosis Date    Arrhythmia     Hypertension     managed with meds    TIA (transient ischemic attack) 2018    no residual/ [facility] ED      Past Surgical History:   Procedure Laterality Date    HX ACL RECONSTRUCTION Left     HX CATARACT REMOVAL Right     no lens implant - traumatic injury caused cataract    HX HERNIA REPAIR Left     with mesh     HX TONSILLECTOMY        No Known Allergies   Social History     Tobacco Use    Smoking status: Never Smoker    Smokeless tobacco: Never Used   Substance Use Topics    Alcohol use: Yes     Alcohol/week: 0.0 - 1.0 standard drinks      Social History     Social History Narrative    Not on file     History reviewed. No pertinent family history. Cannot display prior to admission medications because the patient has not been admitted in this contact. Current Outpatient Medications   Medication Sig    Eliquis 5 mg tablet Take 5 mg by mouth two (2) times a day.     amLODIPine (Norvasc) 5 mg tablet Take 5 mg by mouth nightly.  aspirin (ASPIRIN) 325 mg tablet Take 325 mg by mouth daily. Holding for surgery- last dose 4/7/21    atorvastatin (LIPITOR) 40 mg tablet Take 1 Tab by mouth nightly.  lisinopril (PRINIVIL, ZESTRIL) 40 mg tablet Take 1 Tab by mouth daily. (Patient taking differently: Take 40 mg by mouth nightly.)     No current facility-administered medications for this encounter. Objective:     Vitals:    03/24/22 1426 03/24/22 1429   BP:  (!) 157/86   Pulse:  68   Resp:  18   Temp:  97.7 °F (36.5 °C)   SpO2:  99%   Weight: 73 kg (161 lb)    Height: 5' 7\" (1.702 m)        Physical Exam:  Ambulation: Normal  Gen- the patient is well developed and in no acute distress  HEENT- no focal abnormalities of the scalp or face. Neck- no JVD or retractions  Lungs- normal respiratory effort. Cardiovascular:  Lower limb pulses: 3+. Abd- soft and no masses evident. Ext- warm without cyanosis. There is mild left lower leg edema that appears well controlled. Skin- no jaundice or rashes. Full-thickness wound with necrotic tissue left lower extremity with some surrounding redness, no purulence noted. Please see the specific measurements and descriptions of the wound(s) below. There is no evidence of periwound induration or warmth. No purulence or odor. Neuro- alert and oriented x 3. No gross imotor deficits are present. Lower limb sensation is intact. Psychological: Affect normal. Mood normal. Memory intact. Wound Ankle Left;Medial (Active)   Wound Image    03/24/22 1433   Wound Etiology Traumatic 03/24/22 1433   Dressing Status Breakthrough drainage noted; Old drainage noted 03/24/22 1433   Cleansed Cleansed with saline 03/24/22 1433   Dressing/Treatment Hydrocolloid 03/24/22 1433   Wound Length (cm) 3.5 cm 03/24/22 1433   Wound Width (cm) 4 cm 03/24/22 1433   Wound Depth (cm) 1 cm 03/24/22 1433   Wound Surface Area (cm^2) 14 cm^2 03/24/22 1433   Wound Volume (cm^3) 14 cm^3 03/24/22 1433   Post-Procedure Length (cm) 3.5 cm 03/24/22 1433   Post-Procedure Width (cm) 4 cm 03/24/22 1433   Post-Procedure Depth (cm) 1.4 cm 03/24/22 1433   Post-Procedure Surface Area (cm^2) 14 cm^2 03/24/22 1433   Post-Procedure Volume (cm^3) 19.6 cm^3 03/24/22 1433   Undermining Starts ___ O'Clock 4 o'clock 03/24/22 1433   Undermining Ends ___ O'Clock 7 o'clock 03/24/22 1433   Undermining Maximum Distance (cm) 2 cm 03/24/22 1433   Wound Assessment Eschar moist;Granulation tissue;Slough 03/24/22 1433   Drainage Amount Large 03/24/22 1433   Drainage Description Thick;Sanguineous 03/24/22 1433   Wound Odor Mild 03/24/22 1433   Reanna-Wound/Incision Assessment Blanchable erythema;Edematous; Warm 03/24/22 1433   Wound Thickness Description Full thickness 03/24/22 1433   Number of days: 0        Assessment:   Traumatic wound left lower leg with necrotic tissue  Patient Active Problem List   Diagnosis Code    TIA (transient ischemic attack) G45.9    Hypertensive emergency I16.1    Hx of sinus bradycardia Z86.79    Paroxysmal atrial fibrillation (HCC) I48.0    Traumatic open wound of left lower leg S81.802A     Plan:     Plan for debridement of wound left leg then start dressing changes with Dakin's solution. Patient is to call if the redness worsens otherwise we will see him back in 1 week for recheck. Follow-up Information     Follow up With Specialties Details Why Contact Info     [facility] In 1 week Wednesday [address]  [phone]            Procedure Note  Indications:  Based on my examination of this patient's wound(s)/ulcer(s) today, debridement is required to promote healing and evaluate the wound base.     Performed by: Mendy Rivas MD    Consent obtained: Yes    Time out taken: Yes    Debridement: Excisional    Using # 15 blade scalpel the wound(s)/ulcer(s) was/were sharply debrided down through and including the removal of epidermis, dermis and subcutaneous tissue    Devitalized Tissue Debrided: necrotic/eschar    Pre Debridement Measurements:  Are located in the Wound/Ulcer Documentation Flow Sheet    Non-Pressure ulcer, fat layer exposed    Wound/Ulcer #: 1    Post Debridement Measurements:  Wound/Ulcer Descriptions are Pre Debridement except measurements:    Wound Ankle Left;Medial (Active)   Wound Image    03/24/22 1433   Wound Etiology Traumatic 03/24/22 1433   Dressing Status Breakthrough drainage noted; Old drainage noted 03/24/22 1433   Cleansed Cleansed with saline 03/24/22 1433   Dressing/Treatment Hydrocolloid 03/24/22 1433   Wound Length (cm) 3.5 cm 03/24/22 1433   Wound Width (cm) 4 cm 03/24/22 1433   Wound Depth (cm) 1 cm 03/24/22 1433   Wound Surface Area (cm^2) 14 cm^2 03/24/22 1433   Wound Volume (cm^3) 14 cm^3 03/24/22 1433   Post-Procedure Length (cm) 3.5 cm 03/24/22 1433   Post-Procedure Width (cm) 4 cm 03/24/22 1433   Post-Procedure Depth (cm) 1.4 cm 03/24/22 1433   Post-Procedure Surface Area (cm^2) 14 cm^2 03/24/22 1433   Post-Procedure Volume (cm^3) 19.6 cm^3 03/24/22 1433   Undermining Starts ___ O'Clock 4 o'clock 03/24/22 1433   Undermining Ends ___ O'Clock 7 o'clock 03/24/22 1433   Undermining Maximum Distance (cm) 2 cm 03/24/22 1433   Wound Assessment Eschar moist;Granulation tissue;Slough 03/24/22 1433   Drainage Amount Large 03/24/22 1433   Drainage Description Thick;Sanguineous 03/24/22 1433   Wound Odor Mild 03/24/22 1433   Reanna-Wound/Incision Assessment Blanchable erythema;Edematous; Warm 03/24/22 1433   Wound Thickness Description Full thickness 03/24/22 1433   Number of days: 0        Total Surface Area Debrided:  3 sq cm     Estimated Blood Loss:  None    Hemostasis Achieved: Pressure    Procedural Pain: 0 / 10     Post Procedural Pain: 0 / 10     Response to treatment: Well tolerated by patient       Thank you very much for the opportunity to participate in this patient's care.         Signed By: Rigo West MD     March 24, 2022        TIME:  If total time for today's E/M service is used for level of service it is documented below. This time includes physician non-face-to-face service time visit on the date of service and includes    Preparing to see the patient (eg, review of tests)  Obtaining and/or reviewing separately obtained history  Performing a medically necessary appropriate examination and/or evaluation  Counseling and educating the patient/family/caregiver  Ordering medications, tests, or procedures  Referring and communicating with other health care professionals as needed  Documenting clinical information in the electronic or other health record  Independently interpreting results (not reported separately) and communicating results to the patient/family/caregiver  Care coordination (not reported separately)    E/M time =       25   minutes.

## 2022-03-25 RX ORDER — BACITRACIN ZINC AND POLYMYXIN B SULFATE 500; 1000 [USP'U]/G; [USP'U]/G
OINTMENT TOPICAL ONCE
Status: CANCELLED | OUTPATIENT
Start: 2022-03-25 | End: 2022-03-25

## 2022-03-25 RX ORDER — MUPIROCIN 20 MG/G
OINTMENT TOPICAL ONCE
Status: CANCELLED | OUTPATIENT
Start: 2022-03-25 | End: 2022-03-25

## 2022-03-25 RX ORDER — LIDOCAINE 50 MG/G
OINTMENT TOPICAL ONCE
Status: CANCELLED | OUTPATIENT
Start: 2022-03-25 | End: 2022-03-25

## 2022-03-25 RX ORDER — SILVER SULFADIAZINE 10 G/1000G
CREAM TOPICAL ONCE
Status: CANCELLED | OUTPATIENT
Start: 2022-03-25 | End: 2022-03-25

## 2022-03-25 RX ORDER — LIDOCAINE HYDROCHLORIDE 40 MG/ML
SOLUTION TOPICAL ONCE
Status: CANCELLED | OUTPATIENT
Start: 2022-03-25 | End: 2022-03-25

## 2022-03-25 RX ORDER — GENTAMICIN SULFATE 1 MG/G
OINTMENT TOPICAL ONCE
Status: CANCELLED | OUTPATIENT
Start: 2022-03-25 | End: 2022-03-25

## 2022-03-25 RX ORDER — TRIAMCINOLONE ACETONIDE 1 MG/G
OINTMENT TOPICAL ONCE
Status: CANCELLED | OUTPATIENT
Start: 2022-03-25 | End: 2022-03-25

## 2022-03-25 RX ORDER — LIDOCAINE HYDROCHLORIDE 20 MG/ML
JELLY TOPICAL ONCE
Status: CANCELLED | OUTPATIENT
Start: 2022-03-25 | End: 2022-03-25

## 2022-03-25 RX ORDER — CLOBETASOL PROPIONATE 0.5 MG/G
OINTMENT TOPICAL ONCE
Status: CANCELLED | OUTPATIENT
Start: 2022-03-25 | End: 2022-03-25

## 2022-03-25 RX ORDER — BETAMETHASONE DIPROPIONATE 0.5 MG/G
OINTMENT TOPICAL ONCE
Status: CANCELLED | OUTPATIENT
Start: 2022-03-25 | End: 2022-03-25

## 2022-03-25 RX ORDER — LIDOCAINE 40 MG/G
CREAM TOPICAL ONCE
Status: CANCELLED | OUTPATIENT
Start: 2022-03-25 | End: 2022-03-25

## 2022-03-25 RX ORDER — LIDOCAINE HYDROCHLORIDE 20 MG/ML
5 SOLUTION OROPHARYNGEAL ONCE
Status: CANCELLED | OUTPATIENT
Start: 2022-03-25 | End: 2022-03-25

## 2022-03-25 RX ORDER — BACITRACIN 500 [USP'U]/G
OINTMENT TOPICAL ONCE
Status: CANCELLED | OUTPATIENT
Start: 2022-03-25 | End: 2022-03-25

## 2022-03-30 ENCOUNTER — HOSPITAL ENCOUNTER (OUTPATIENT)
Dept: WOUND CARE | Age: 72
Discharge: HOME | End: 2022-03-30
Attending: SURGERY
Payer: COMMERCIAL

## 2022-03-30 VITALS
HEIGHT: 67 IN | RESPIRATION RATE: 18 BRPM | SYSTOLIC BLOOD PRESSURE: 146 MMHG | BODY MASS INDEX: 25.11 KG/M2 | WEIGHT: 160 LBS | DIASTOLIC BLOOD PRESSURE: 86 MMHG | TEMPERATURE: 98.1 F | HEART RATE: 64 BPM

## 2022-03-30 DIAGNOSIS — S81.802A TRAUMATIC OPEN WOUND OF LEFT LOWER LEG, INITIAL ENCOUNTER: Primary | ICD-10-CM

## 2022-03-30 PROCEDURE — 87205 SMEAR GRAM STAIN: CPT

## 2022-03-30 PROCEDURE — 99213 OFFICE O/P EST LOW 20 MIN: CPT

## 2022-03-30 PROCEDURE — 87077 CULTURE AEROBIC IDENTIFY: CPT

## 2022-03-30 PROCEDURE — 87186 SC STD MICRODIL/AGAR DIL: CPT

## 2022-03-30 PROCEDURE — 99213 OFFICE O/P EST LOW 20 MIN: CPT | Performed by: SURGERY

## 2022-03-30 RX ORDER — BACITRACIN 500 [USP'U]/G
OINTMENT TOPICAL ONCE
Status: CANCELLED | OUTPATIENT
Start: 2022-03-30 | End: 2022-03-30

## 2022-03-30 RX ORDER — LIDOCAINE 50 MG/G
OINTMENT TOPICAL ONCE
Status: CANCELLED | OUTPATIENT
Start: 2022-03-30 | End: 2022-03-30

## 2022-03-30 RX ORDER — LIDOCAINE 40 MG/G
CREAM TOPICAL ONCE
Status: CANCELLED | OUTPATIENT
Start: 2022-03-30 | End: 2022-03-30

## 2022-03-30 RX ORDER — GENTAMICIN SULFATE 1 MG/G
OINTMENT TOPICAL ONCE
Status: CANCELLED | OUTPATIENT
Start: 2022-03-30 | End: 2022-03-30

## 2022-03-30 RX ORDER — BETAMETHASONE DIPROPIONATE 0.5 MG/G
OINTMENT TOPICAL ONCE
Status: CANCELLED | OUTPATIENT
Start: 2022-03-30 | End: 2022-03-30

## 2022-03-30 RX ORDER — TRIAMCINOLONE ACETONIDE 1 MG/G
OINTMENT TOPICAL ONCE
Status: CANCELLED | OUTPATIENT
Start: 2022-03-30 | End: 2022-03-30

## 2022-03-30 RX ORDER — CLOBETASOL PROPIONATE 0.5 MG/G
OINTMENT TOPICAL ONCE
Status: CANCELLED | OUTPATIENT
Start: 2022-03-30 | End: 2022-03-30

## 2022-03-30 RX ORDER — LIDOCAINE HYDROCHLORIDE 20 MG/ML
JELLY TOPICAL ONCE
Status: CANCELLED | OUTPATIENT
Start: 2022-03-30 | End: 2022-03-30

## 2022-03-30 RX ORDER — SILVER SULFADIAZINE 10 G/1000G
CREAM TOPICAL ONCE
Status: CANCELLED | OUTPATIENT
Start: 2022-03-30 | End: 2022-03-30

## 2022-03-30 RX ORDER — LIDOCAINE HYDROCHLORIDE 20 MG/ML
5 SOLUTION OROPHARYNGEAL ONCE
Status: CANCELLED | OUTPATIENT
Start: 2022-03-30 | End: 2022-03-30

## 2022-03-30 RX ORDER — LIDOCAINE HYDROCHLORIDE 40 MG/ML
SOLUTION TOPICAL ONCE
Status: CANCELLED | OUTPATIENT
Start: 2022-03-30 | End: 2022-03-30

## 2022-03-30 RX ORDER — MUPIROCIN 20 MG/G
OINTMENT TOPICAL ONCE
Status: CANCELLED | OUTPATIENT
Start: 2022-03-30 | End: 2022-03-30

## 2022-03-30 RX ORDER — AMOXICILLIN AND CLAVULANATE POTASSIUM 875; 125 MG/1; MG/1
1 TABLET, FILM COATED ORAL 2 TIMES DAILY
Qty: 14 TABLET | Refills: 0 | Status: SHIPPED | OUTPATIENT
Start: 2022-03-30 | End: 2022-04-06 | Stop reason: ALTCHOICE

## 2022-03-30 RX ORDER — BACITRACIN ZINC AND POLYMYXIN B SULFATE 500; 1000 [USP'U]/G; [USP'U]/G
OINTMENT TOPICAL ONCE
Status: CANCELLED | OUTPATIENT
Start: 2022-03-30 | End: 2022-03-30

## 2022-03-30 NOTE — WOUND CARE
03/30/22 1444   Wound Ankle Left;Medial   Date First Assessed/Time First Assessed: 03/24/22 1432   Present on Hospital Admission: Yes  Wound Approximate Age at First Assessment (Weeks): 5 weeks  Primary Wound Type: Traumatic  Location: Ankle  Wound Location Orientation: Left;Medial   Wound Image    Wound Etiology Traumatic   Dressing Status Old drainage noted   Cleansed Cleansed with saline   Dressing/Treatment   (dakins gauze, abd, tape)   Wound Length (cm) 3.3 cm   Wound Width (cm) 4.1 cm   Wound Depth (cm) 0.7 cm   Wound Surface Area (cm^2) 13.53 cm^2   Change in Wound Size % 3.36   Wound Volume (cm^3) 9.471 cm^3   Wound Healing % 32   Undermining Starts ___ O'Clock 5 o'clock   Undermining Ends ___ O'Clock 7 o'clock   Undermining Maximum Distance (cm) 0.6 cm   Wound Assessment Eschar moist;Granulation tissue;Slough   Drainage Amount Large   Drainage Description Serosanguinous   Wound Odor Mild   Reanna-Wound/Incision Assessment Blanchable erythema   Wound Thickness Description Full thickness   patient is taking aspirin and eliquis

## 2022-03-30 NOTE — PROGRESS NOTES
[address], [address], [address]  [phone]    Progress Notes   [place] MRN: [id number]     : 1950     Age: 80 y.o. Subjective/HPI:   This patient is a 80 y.o. male seen and evaluated at the wound clinic for follow-up of traumatic wound of the left leg. Patient had surgical debridement of the wound done in clinic 1 week ago and has been receiving dressing changes with Dakin's solution. On today's visit he reports having a foul odor and some slight tenderness around the wound but he denies having any fever. There has been some purulent drainage. Patient is able to ambulate without difficulty. Review of Systems  A comprehensive review of systems was negative except for that written in the HPI. Past Medical History:   Diagnosis Date    Arrhythmia     Hypertension     managed with meds    TIA (transient ischemic attack) 2018    no residual/ [facility] ED      Past Surgical History:   Procedure Laterality Date    HX ACL RECONSTRUCTION Left     HX CATARACT REMOVAL Right     no lens implant - traumatic injury caused cataract    HX HERNIA REPAIR Left     with mesh     HX TONSILLECTOMY        No Known Allergies   Social History     Tobacco Use    Smoking status: Never Smoker    Smokeless tobacco: Never Used   Substance Use Topics    Alcohol use: Yes     Alcohol/week: 0.0 - 1.0 standard drinks      Social History     Social History Narrative    Not on file     History reviewed. No pertinent family history. Cannot display prior to admission medications because the patient has not been admitted in this contact. Current Outpatient Medications   Medication Sig    Eliquis 5 mg tablet Take 5 mg by mouth two (2) times a day.  amLODIPine (Norvasc) 5 mg tablet Take 5 mg by mouth nightly.  aspirin (ASPIRIN) 325 mg tablet Take 325 mg by mouth daily.  Holding for surgery- last dose 21    atorvastatin (LIPITOR) 40 mg tablet Take 1 Tab by mouth nightly.  lisinopril (PRINIVIL, ZESTRIL) 40 mg tablet Take 1 Tab by mouth daily. (Patient taking differently: Take 40 mg by mouth nightly.)     Current Facility-Administered Medications   Medication Dose Route Frequency    sodium hypochlorite (HALF STRENGTH DAKIN'S) 0.25% irrigation (bottle)   Topical ONCE     Objective:     Vitals:    03/30/22 1433 03/30/22 1444   BP:  (!) 146/86   Pulse:  64   Resp:  18   Temp:  98.1 °F (36.7 °C)   Weight: 72.6 kg (160 lb)    Height: 5' 7\" (1.702 m)        Physical Exam:  Ambulation: Normal  Gen- the patient is well developed and in no acute distress  HEENT- no focal abnormalities of the scalp or face. Neck- no JVD or retractions  Lungs- normal respiratory effort. Cardiovascular:  Lower limb pulses: 3+. Abd- soft and no masses evident. Ext- warm without cyanosis. There is no lower leg edema. Skin- no jaundice or rashes. Open wound left lower extremity with some slough and small amount of necrotic tissue, some purulent drainage noted at the level of the fascia. Redness around the wound has improved since previous visit. Please see the specific measurements and descriptions of the wound(s) below. There is no evidence of periwound induration or warmth. .   Neuro- alert and oriented x 3. No gross imotor deficits are present. Lower limb sensation is intact. Psychological: Affect normal. Mood normal. Memory intact.      Wound Ankle Left;Medial (Active)   Wound Image   03/30/22 1444   Wound Etiology Traumatic 03/30/22 1444   Dressing Status Old drainage noted 03/30/22 1444   Cleansed Cleansed with saline 03/30/22 1444   Dressing/Treatment Hydrocolloid 03/24/22 1433   Wound Length (cm) 3.3 cm 03/30/22 1444   Wound Width (cm) 4.1 cm 03/30/22 1444   Wound Depth (cm) 0.7 cm 03/30/22 1444   Wound Surface Area (cm^2) 13.53 cm^2 03/30/22 1444   Change in Wound Size % 3.36 03/30/22 1444   Wound Volume (cm^3) 9.471 cm^3 03/30/22 1444   Wound Healing % 32 03/30/22 1444   Post-Procedure Length (cm) 3.5 cm 03/24/22 1433   Post-Procedure Width (cm) 4 cm 03/24/22 1433   Post-Procedure Depth (cm) 1.4 cm 03/24/22 1433   Post-Procedure Surface Area (cm^2) 14 cm^2 03/24/22 1433   Post-Procedure Volume (cm^3) 19.6 cm^3 03/24/22 1433   Undermining Starts ___ O'Clock 5 o'clock 03/30/22 1444   Undermining Ends ___ O'Clock 7 o'clock 03/30/22 1444   Undermining Maximum Distance (cm) 0.6 cm 03/30/22 1444   Wound Assessment Eschar moist;Granulation tissue;Slough 03/30/22 1444   Drainage Amount Large 03/30/22 1444   Drainage Description Serosanguinous 03/30/22 1444   Wound Odor Mild 03/30/22 1444   Reanna-Wound/Incision Assessment Blanchable erythema 03/30/22 1444   Wound Thickness Description Full thickness 03/30/22 1444   Number of days: 6        Assessment:   Traumatic open wound of left lower leg with infection  Patient Active Problem List   Diagnosis Code    TIA (transient ischemic attack) G45.9    Hypertensive emergency I16.1    Hx of sinus bradycardia Z86.79    Paroxysmal atrial fibrillation (HCC) I48.0    Traumatic open wound of left lower leg S81.802A     Plan:   Plan to start Augmentin 875 mg p.o. twice daily, continue dressing changes with Dakin's solution. Follow-up in 1 week for recheck. Follow-up Information     Follow up With Specialties Details Why Contact Info    [address] In 1 week  [address] [place] [address] [place] [id number] [phone]            Thank you very much for the opportunity to participate in this patient's care. Signed By: Arnie Burton MD     March 30, 2022        TIME:  If total time for today's E/M service is used for level of service it is documented below.     This time includes physician non-face-to-face service time visit on the date of service and includes    Preparing to see the patient (eg, review of tests)  Obtaining and/or reviewing separately obtained history  Performing a medically necessary appropriate examination and/or evaluation  Counseling and educating the patient/family/caregiver  Ordering medications, tests, or procedures  Referring and communicating with other health care professionals as needed  Documenting clinical information in the electronic or other health record  Independently interpreting results (not reported separately) and communicating results to the patient/family/caregiver  Care coordination (not reported separately)    E/M time =      20    minutes.

## 2022-04-04 ENCOUNTER — TELEPHONE (OUTPATIENT)
Dept: WOUND CARE | Age: 72
End: 2022-04-04

## 2022-04-04 DIAGNOSIS — S81.802A TRAUMATIC OPEN WOUND OF LEFT LOWER LEG, INITIAL ENCOUNTER: ICD-10-CM

## 2022-04-04 LAB
BACTERIA SPEC CULT: ABNORMAL
GRAM STN SPEC: ABNORMAL
SERVICE CMNT-IMP: ABNORMAL

## 2022-04-04 NOTE — TELEPHONE ENCOUNTER
This RN reviewed wound culture with Dr. Pavel Salas via phone. No new antibiotics needed per Dr. Pavel Salas. This RN called patient who states wound is much improved; no odor, redness.

## 2022-04-06 ENCOUNTER — HOSPITAL ENCOUNTER (OUTPATIENT)
Dept: WOUND CARE | Age: 72
Discharge: HOME | End: 2022-04-06
Attending: SURGERY
Payer: COMMERCIAL

## 2022-04-06 VITALS
BODY MASS INDEX: 25.43 KG/M2 | WEIGHT: 162 LBS | HEART RATE: 61 BPM | HEIGHT: 67 IN | SYSTOLIC BLOOD PRESSURE: 142 MMHG | TEMPERATURE: 97.3 F | DIASTOLIC BLOOD PRESSURE: 85 MMHG

## 2022-04-06 DIAGNOSIS — S81.802A TRAUMATIC OPEN WOUND OF LEFT LOWER LEG, INITIAL ENCOUNTER: Primary | ICD-10-CM

## 2022-04-06 PROCEDURE — 99212 OFFICE O/P EST SF 10 MIN: CPT

## 2022-04-06 PROCEDURE — 99213 OFFICE O/P EST LOW 20 MIN: CPT | Performed by: SURGERY

## 2022-04-06 RX ORDER — LIDOCAINE 50 MG/G
OINTMENT TOPICAL ONCE
Status: CANCELLED | OUTPATIENT
Start: 2022-04-06 | End: 2022-04-06

## 2022-04-06 RX ORDER — LIDOCAINE HYDROCHLORIDE 20 MG/ML
JELLY TOPICAL ONCE
Status: CANCELLED | OUTPATIENT
Start: 2022-04-06 | End: 2022-04-06

## 2022-04-06 RX ORDER — MUPIROCIN 20 MG/G
OINTMENT TOPICAL ONCE
Status: CANCELLED | OUTPATIENT
Start: 2022-04-06 | End: 2022-04-06

## 2022-04-06 RX ORDER — TRIAMCINOLONE ACETONIDE 1 MG/G
OINTMENT TOPICAL ONCE
Status: CANCELLED | OUTPATIENT
Start: 2022-04-06 | End: 2022-04-06

## 2022-04-06 RX ORDER — LIDOCAINE HYDROCHLORIDE 40 MG/ML
SOLUTION TOPICAL ONCE
Status: CANCELLED | OUTPATIENT
Start: 2022-04-06 | End: 2022-04-06

## 2022-04-06 RX ORDER — BETAMETHASONE DIPROPIONATE 0.5 MG/G
OINTMENT TOPICAL ONCE
Status: CANCELLED | OUTPATIENT
Start: 2022-04-06 | End: 2022-04-06

## 2022-04-06 RX ORDER — BACITRACIN ZINC AND POLYMYXIN B SULFATE 500; 1000 [USP'U]/G; [USP'U]/G
OINTMENT TOPICAL ONCE
Status: CANCELLED | OUTPATIENT
Start: 2022-04-06 | End: 2022-04-06

## 2022-04-06 RX ORDER — BACITRACIN 500 [USP'U]/G
OINTMENT TOPICAL ONCE
Status: CANCELLED | OUTPATIENT
Start: 2022-04-06 | End: 2022-04-06

## 2022-04-06 RX ORDER — GENTAMICIN SULFATE 1 MG/G
OINTMENT TOPICAL ONCE
Status: CANCELLED | OUTPATIENT
Start: 2022-04-06 | End: 2022-04-06

## 2022-04-06 RX ORDER — CLOBETASOL PROPIONATE 0.5 MG/G
OINTMENT TOPICAL ONCE
Status: CANCELLED | OUTPATIENT
Start: 2022-04-06 | End: 2022-04-06

## 2022-04-06 RX ORDER — SILVER SULFADIAZINE 10 G/1000G
CREAM TOPICAL ONCE
Status: CANCELLED | OUTPATIENT
Start: 2022-04-06 | End: 2022-04-06

## 2022-04-06 RX ORDER — LIDOCAINE 40 MG/G
CREAM TOPICAL ONCE
Status: CANCELLED | OUTPATIENT
Start: 2022-04-06 | End: 2022-04-06

## 2022-04-06 RX ORDER — LIDOCAINE HYDROCHLORIDE 20 MG/ML
5 SOLUTION OROPHARYNGEAL ONCE
Status: CANCELLED | OUTPATIENT
Start: 2022-04-06 | End: 2022-04-06

## 2022-04-06 NOTE — WOUND CARE
[address]  [address], [address]  Phone: [phone]  Fax: [phone]    Patient: Meggan Simpson. MRN: [id number]  SSN: [id number]    YOB: 1950  Age: 80 y.o. Sex: male       Return Appointment: 1 week with Dr. Sarah Mcintyre    Instructions: Left Medial Ankle:  Cleanse wound with normal saline. DO NOT GET WET IN SHOWER. Apply Dakin's solution 0.25%-apply to wound on gauze or packing strip. Cover with ABD/ Optilock pad/gauze 4x4. Cover with rolled gauze and tubigrip stocking. Change daily.      *Eat high protein diet and drink Robbie packet once a day to aid in wound healing. Should you experience increased redness, swelling, pain, foul odor, size of wound(s), or have a temperature over 101 degrees please contact the [address] [place] [address] Road at [phone] or if after hours contact your primary care physician or go to the hospital emergency department.     Signed By: Nataliya Arguello RN     April 6, 2022

## 2022-04-06 NOTE — PROGRESS NOTES
[address], [address], [address]  [phone]    Progress Notes   [place] MRN: [id number]     : 1950     Age: 80 y.o. Subjective/HPI:   This patient is a 80 y.o. male seen and evaluated at the wound clinic for follow-up of traumatic wound of the left leg. Last week patient was found to have some purulent drainage and was started on Augmentin. He has been receiving dressing changes with Dakin solution. On today's visit he reports resolution of the foul odor and tenderness. Patient has not had any unusual pain or discomfort nor has he had any erythema or fever. He continues to ambulate without difficulty. Patient does report some slight swelling of his ankle and foot at the end of the day. Review of Systems  A comprehensive review of systems was negative except for that written in the HPI. Past Medical History:   Diagnosis Date    Arrhythmia     Hypertension     managed with meds    TIA (transient ischemic attack) 2018    no residual/ [facility] ED      Past Surgical History:   Procedure Laterality Date    HX ACL RECONSTRUCTION Left     HX CATARACT REMOVAL Right     no lens implant - traumatic injury caused cataract    HX HERNIA REPAIR Left     with mesh     HX TONSILLECTOMY        No Known Allergies   Social History     Tobacco Use    Smoking status: Never Smoker    Smokeless tobacco: Never Used   Substance Use Topics    Alcohol use: Yes     Alcohol/week: 0.0 - 1.0 standard drinks      Social History     Social History Narrative    Not on file     History reviewed. No pertinent family history. Cannot display prior to admission medications because the patient has not been admitted in this contact. Current Outpatient Medications   Medication Sig    amoxicillin-clavulanate (Augmentin) 875-125 mg per tablet Take 1 Tablet by mouth two (2) times a day.  Indications: an infection of the skin and the tissue below the skin    Eliquis 5 mg tablet Take 5 mg by mouth two (2) times a day.  amLODIPine (Norvasc) 5 mg tablet Take 5 mg by mouth nightly.  aspirin (ASPIRIN) 325 mg tablet Take 325 mg by mouth daily. Holding for surgery- last dose 4/7/21    atorvastatin (LIPITOR) 40 mg tablet Take 1 Tab by mouth nightly.  lisinopril (PRINIVIL, ZESTRIL) 40 mg tablet Take 1 Tab by mouth daily. (Patient taking differently: Take 40 mg by mouth nightly.)     No current facility-administered medications for this encounter. Objective:     Vitals:    04/06/22 1122 04/06/22 1134   BP:  (!) 142/85   Pulse:  61   Temp:  97.3 °F (36.3 °C)   Weight: 73.5 kg (162 lb)    Height: 5' 7\" (1.702 m)        Physical Exam:  Ambulation: Normal  Gen- the patient is well developed and in no acute distress  HEENT- no focal abnormalities of the scalp or face. Neck- no JVD or retractions  Lungs- normal respiratory effort. Cardiovascular:  Lower limb pulses: 3+. Abd- soft and no masses evident. Ext- warm without cyanosis. There is no lower leg edema. Skin- no jaundice or rashes. Open wound left lower extremity with some fibrin and slough within the base, no erythema purulent drainage. Please see the specific measurements and descriptions of the wound(s) below. There is no evidence of periwound induration or warmth. No purulence or odor. Neuro- alert and oriented x 3. No gross imotor deficits are present. Lower limb sensation is intact. Psychological: Affect normal. Mood normal. Memory intact.      Wound Ankle Left;Medial (Active)   Wound Image   04/06/22 1135   Wound Etiology Traumatic 04/06/22 1135   Dressing Status Old drainage noted 04/06/22 1135   Cleansed Cleansed with saline 04/06/22 1135   Dressing/Treatment Packing 04/06/22 1135   Wound Length (cm) 3.5 cm 04/06/22 1135   Wound Width (cm) 3.5 cm 04/06/22 1135   Wound Depth (cm) 0.7 cm 04/06/22 1135   Wound Surface Area (cm^2) 12.25 cm^2 04/06/22 1135 Change in Wound Size % 12.5 04/06/22 1135   Wound Volume (cm^3) 8.575 cm^3 04/06/22 1135   Wound Healing % 39 04/06/22 1135   Post-Procedure Length (cm) 3.5 cm 03/24/22 1433   Post-Procedure Width (cm) 4 cm 03/24/22 1433   Post-Procedure Depth (cm) 1.4 cm 03/24/22 1433   Post-Procedure Surface Area (cm^2) 14 cm^2 03/24/22 1433   Post-Procedure Volume (cm^3) 19.6 cm^3 03/24/22 1433   Undermining Starts ___ O'Clock 5 o'clock 04/06/22 1135   Undermining Ends ___ O'Clock 7 o'clock 04/06/22 1135   Undermining Maximum Distance (cm) 0.5 cm 04/06/22 1135   Wound Assessment Slough;Granulation tissue;Pink/red 04/06/22 1135   Drainage Amount Moderate 04/06/22 1135   Drainage Description Serosanguinous 04/06/22 1135   Wound Odor None 04/06/22 1135   Reanna-Wound/Incision Assessment Intact 04/06/22 1135   Wound Thickness Description Full thickness 04/06/22 1135   Number of days: 13        Assessment:   Traumatic open wound of left lower leg, slowly improving  Patient Active Problem List   Diagnosis Code    TIA (transient ischemic attack) G45.9    Hypertensive emergency I16.1    Hx of sinus bradycardia Z86.79    Paroxysmal atrial fibrillation (HCC) I48.0    Traumatic open wound of left lower leg S81.802A     Plan:     Continue dressing changes with Dakin's solution until wound is  then hopefully switch to collagen based dressing to help promote healing. Patient advised to use Tubigrip to help prevent swelling. Follow-up Information     Follow up With Specialties Details Why Contact Info    [address] In 1 week For wound re-check [address] [facility] [id number] [phone]            Thank you very much for the opportunity to participate in this patient's care. Signed By: Conchis Hopikns MD     April 6, 2022        TIME:  If total time for today's E/M service is used for level of service it is documented below.     This time includes physician non-face-to-face service time visit on the date of service and includes    Preparing to see the patient (eg, review of tests)  Obtaining and/or reviewing separately obtained history  Performing a medically necessary appropriate examination and/or evaluation  Counseling and educating the patient/family/caregiver  Ordering medications, tests, or procedures  Referring and communicating with other health care professionals as needed  Documenting clinical information in the electronic or other health record  Independently interpreting results (not reported separately) and communicating results to the patient/family/caregiver  Care coordination (not reported separately)    E/M time =     20     minutes.

## 2022-04-06 NOTE — DISCHARGE INSTRUCTIONS
[address]  [address], [address]  Phone: [phone]  Fax: [phone]    Patient: Myra Eubanks. MRN: [id number]  SSN: [id number]    YOB: 1950  Age: 80 y.o. Sex: male       Return Appointment: 1 week with Dr. Maxx De Leon    Instructions: Left Medial Ankle:  Cleanse wound with normal saline. DO NOT GET WET IN SHOWER. Apply Dakin's solution 0.25%-apply to wound on gauze or packing strip. Cover with ABD/ Optilock pad/gauze 4x4. Cover with rolled gauze and tubigrip stocking. Change daily.      *Eat high protein diet and drink Robbie packet once a day to aid in wound healing. Should you experience increased redness, swelling, pain, foul odor, size of wound(s), or have a temperature over 101 degrees please contact the [address] [place] [address] Road at [phone] or if after hours contact your primary care physician or go to the hospital emergency department.     Signed By: Saad Marrufo RN     April 6, 2022

## 2022-04-13 ENCOUNTER — HOSPITAL ENCOUNTER (OUTPATIENT)
Dept: WOUND CARE | Age: 72
Discharge: HOME | End: 2022-04-13
Attending: SURGERY
Payer: COMMERCIAL

## 2022-04-13 VITALS
DIASTOLIC BLOOD PRESSURE: 90 MMHG | HEART RATE: 55 BPM | TEMPERATURE: 97.7 F | RESPIRATION RATE: 18 BRPM | OXYGEN SATURATION: 96 % | SYSTOLIC BLOOD PRESSURE: 147 MMHG

## 2022-04-13 DIAGNOSIS — S81.802A TRAUMATIC OPEN WOUND OF LEFT LOWER LEG, INITIAL ENCOUNTER: Primary | ICD-10-CM

## 2022-04-13 PROCEDURE — 99213 OFFICE O/P EST LOW 20 MIN: CPT | Performed by: SURGERY

## 2022-04-13 PROCEDURE — 99213 OFFICE O/P EST LOW 20 MIN: CPT

## 2022-04-13 RX ORDER — LIDOCAINE 40 MG/G
CREAM TOPICAL ONCE
Status: CANCELLED | OUTPATIENT
Start: 2022-04-13 | End: 2022-04-13

## 2022-04-13 RX ORDER — CLOBETASOL PROPIONATE 0.5 MG/G
OINTMENT TOPICAL ONCE
Status: CANCELLED | OUTPATIENT
Start: 2022-04-13 | End: 2022-04-13

## 2022-04-13 RX ORDER — BACITRACIN 500 [USP'U]/G
OINTMENT TOPICAL ONCE
Status: CANCELLED | OUTPATIENT
Start: 2022-04-13 | End: 2022-04-13

## 2022-04-13 RX ORDER — LIDOCAINE HYDROCHLORIDE 20 MG/ML
5 SOLUTION OROPHARYNGEAL ONCE
Status: CANCELLED | OUTPATIENT
Start: 2022-04-13 | End: 2022-04-13

## 2022-04-13 RX ORDER — LIDOCAINE HYDROCHLORIDE 20 MG/ML
JELLY TOPICAL ONCE
Status: CANCELLED | OUTPATIENT
Start: 2022-04-13 | End: 2022-04-13

## 2022-04-13 RX ORDER — BACITRACIN ZINC AND POLYMYXIN B SULFATE 500; 1000 [USP'U]/G; [USP'U]/G
OINTMENT TOPICAL ONCE
Status: CANCELLED | OUTPATIENT
Start: 2022-04-13 | End: 2022-04-13

## 2022-04-13 RX ORDER — LIDOCAINE HYDROCHLORIDE 40 MG/ML
SOLUTION TOPICAL ONCE
Status: CANCELLED | OUTPATIENT
Start: 2022-04-13 | End: 2022-04-13

## 2022-04-13 RX ORDER — BETAMETHASONE DIPROPIONATE 0.5 MG/G
OINTMENT TOPICAL ONCE
Status: CANCELLED | OUTPATIENT
Start: 2022-04-13 | End: 2022-04-13

## 2022-04-13 RX ORDER — SILVER SULFADIAZINE 10 G/1000G
CREAM TOPICAL ONCE
Status: CANCELLED | OUTPATIENT
Start: 2022-04-13 | End: 2022-04-13

## 2022-04-13 RX ORDER — TRIAMCINOLONE ACETONIDE 1 MG/G
OINTMENT TOPICAL ONCE
Status: CANCELLED | OUTPATIENT
Start: 2022-04-13 | End: 2022-04-13

## 2022-04-13 RX ORDER — GENTAMICIN SULFATE 1 MG/G
OINTMENT TOPICAL ONCE
Status: CANCELLED | OUTPATIENT
Start: 2022-04-13 | End: 2022-04-13

## 2022-04-13 RX ORDER — MUPIROCIN 20 MG/G
OINTMENT TOPICAL ONCE
Status: CANCELLED | OUTPATIENT
Start: 2022-04-13 | End: 2022-04-13

## 2022-04-13 RX ORDER — LIDOCAINE 50 MG/G
OINTMENT TOPICAL ONCE
Status: CANCELLED | OUTPATIENT
Start: 2022-04-13 | End: 2022-04-13

## 2022-04-13 NOTE — DISCHARGE INSTRUCTIONS
[address]  [address], [address]  Phone: [phone]  Fax: [phone]    Patient: Antwon Paige. MRN: [id number]  SSN: [id number]    YOB: 1950  Age: 80 y.o. Sex: male       Return Appointment: 2 weeks with Dr. Vilma Diaz    Instructions: Left Medial Ankle:  Cleanse wound with normal saline. DO NOT GET WET IN SHOWER. Apply Dakin's solution 0.25%-apply to wound on gauze or packing strip. Cover with ABD/ Optilock pad/gauze 4x4. Cover with rolled gauze and tubigrip stocking. Change daily.      *Eat high protein diet and drink Robbie packet once a day to aid in wound healing.       Should you experience increased redness, swelling, pain, foul odor, size of wound(s), or have a temperature over 101 degrees please contact the [address] [place] [address] Road at [phone] or if after hours contact your primary care physician or go to the hospital emergency department.     Signed By: Elva Valentino PT, [facility]     April 13, 2022

## 2022-04-13 NOTE — PROGRESS NOTES
04/13/22 0819   Wound Ankle Left;Medial   Date First Assessed/Time First Assessed: 03/24/22 1432   Present on Hospital Admission: Yes  Wound Approximate Age at First Assessment (Weeks): 5 weeks  Primary Wound Type: Traumatic  Location: Ankle  Wound Location Orientation: Left;Medial   Wound Image    Wound Etiology Traumatic   Dressing Status Old drainage noted   Cleansed Cleansed with saline   Dressing/Treatment   (Dakins gauze, ABD, rolled gauze)   Wound Length (cm) 3.2 cm   Wound Width (cm) 3.3 cm   Wound Depth (cm) 0.5 cm   Wound Surface Area (cm^2) 10.56 cm^2   Change in Wound Size % 24.57   Wound Volume (cm^3) 5.28 cm^3   Wound Healing % 62   Wound Assessment Granulation tissue   Drainage Amount Moderate   Drainage Description Serosanguinous   Wound Odor None   Reanna-Wound/Incision Assessment Intact   Wound Thickness Description Full thickness   Patient is currently taking Eliquis and Aspirin 325 mg

## 2022-04-13 NOTE — WOUND CARE
[address]  [address], [address]  Phone: [phone]  Fax: [phone]    Patient: Cristhian Ross. MRN: [id number]  SSN: [id number]    YOB: 1950  Age: 80 y.o. Sex: male       Return Appointment: 2 weeks with Dr. Jovana Waters    Instructions: Left Medial Ankle:  Cleanse wound with normal saline. DO NOT GET WET IN SHOWER. Puracol Plus (may substitute equivalent collagen):cut to approximate wound size, apply to wound bed. Cover with ABD/ gauze 4x4. Cover with rolled gauze and tubigrip stocking. Change 3 times per week.      *Eat high protein diet and drink Robbie packet once a day to aid in wound healing.       Should you experience increased redness, swelling, pain, foul odor, size of wound(s), or have a temperature over 101 degrees please contact the [address] [place] [address] Road at [phone] or if after hours contact your primary care physician or go to the hospital emergency department.     Signed By: Chinedu Ramírez, PT, [address] [facility],[address]     April 13, 2022

## 2022-04-13 NOTE — PROGRESS NOTES
[address], [address], [address]  [phone]    Progress Notes   Ely MRN: [id number]     : 1950     Age: 80 y.o. Subjective/HPI:   This patient is a 80 y.o. male who returns today for follow-up of traumatic wound of the left lower extremity. Patient has been receiving wet wet dressing changes with Dakin's solution. He reports some mild tenderness around the wound during dressing changes but otherwise has been doing well. He has not had any erythema or purulent drainage and continues to ambulate without difficulty. He has not had any significant swelling. Review of Systems  A comprehensive review of systems was negative except for that written in the HPI. Past Medical History:   Diagnosis Date    Arrhythmia     Hypertension     managed with meds    TIA (transient ischemic attack) 2018    no residual/ [facility]T ED      Past Surgical History:   Procedure Laterality Date    HX ACL RECONSTRUCTION Left     HX CATARACT REMOVAL Right     no lens implant - traumatic injury caused cataract    HX HERNIA REPAIR Left     with mesh     HX TONSILLECTOMY        No Known Allergies   Social History     Tobacco Use    Smoking status: Never Smoker    Smokeless tobacco: Never Used   Substance Use Topics    Alcohol use: Yes     Alcohol/week: 0.0 - 1.0 standard drinks      Social History     Social History Narrative    Not on file     History reviewed. No pertinent family history. Cannot display prior to admission medications because the patient has not been admitted in this contact. Current Outpatient Medications   Medication Sig    Eliquis 5 mg tablet Take 5 mg by mouth two (2) times a day.  amLODIPine (Norvasc) 5 mg tablet Take 5 mg by mouth nightly.  aspirin (ASPIRIN) 325 mg tablet Take 325 mg by mouth daily.  Holding for surgery- last dose 21    atorvastatin (LIPITOR) 40 mg tablet Take 1 Tab by mouth nightly.  lisinopril (PRINIVIL, ZESTRIL) 40 mg tablet Take 1 Tab by mouth daily. (Patient taking differently: Take 40 mg by mouth nightly.)     No current facility-administered medications for this encounter. Objective:     Vitals:    04/13/22 0700   BP: (!) 147/90   Pulse: (!) 55   Resp: 18   Temp: 97.7 °F (36.5 °C)   SpO2: 96%       Physical Exam:  Ambulation: Normal  Gen- the patient is well developed and in no acute distress  HEENT- no focal abnormalities of the scalp or face. Neck- no JVD or retractions  Lungs- normal respiratory effort. Cardiovascular:  Lower limb pulses: 3+. Abd- soft and no masses evident. Ext- warm without cyanosis. There is no significant lower leg edema. Skin- no jaundice or rashes. Open wound left lower extremity with healthy-appearing granulation tissue, no sign of infection, minimal slough. Please see the specific measurements and descriptions of the wound(s) below. There is no evidence of periwound induration or warmth. No purulence or odor. Neuro- alert and oriented x 3. No gross imotor deficits are present. Lower limb sensation is intact. Psychological: Affect normal. Mood normal. Memory intact.      Wound Ankle Left;Medial (Active)   Wound Image   04/13/22 0819   Wound Etiology Traumatic 04/13/22 0819   Dressing Status Old drainage noted 04/13/22 0819   Cleansed Cleansed with saline 04/13/22 0819   Dressing/Treatment Packing 04/06/22 1135   Wound Length (cm) 3.2 cm 04/13/22 0819   Wound Width (cm) 3.3 cm 04/13/22 0819   Wound Depth (cm) 0.5 cm 04/13/22 0819   Wound Surface Area (cm^2) 10.56 cm^2 04/13/22 0819   Change in Wound Size % 24.57 04/13/22 0819   Wound Volume (cm^3) 5.28 cm^3 04/13/22 0819   Wound Healing % 62 04/13/22 0819   Post-Procedure Length (cm) 3.5 cm 03/24/22 1433   Post-Procedure Width (cm) 4 cm 03/24/22 1433   Post-Procedure Depth (cm) 1.4 cm 03/24/22 1433   Post-Procedure Surface Area (cm^2) 14 cm^2 03/24/22 1435 Post-Procedure Volume (cm^3) 19.6 cm^3 03/24/22 1433   Undermining Starts ___ O'Clock 5 o'clock 04/06/22 1135   Undermining Ends ___ O'Clock 7 o'clock 04/06/22 1135   Undermining Maximum Distance (cm) 0.5 cm 04/06/22 1135   Wound Assessment Granulation tissue 04/13/22 0819   Drainage Amount Moderate 04/13/22 0819   Drainage Description Serosanguinous 04/13/22 0819   Wound Odor None 04/13/22 0819   Reanna-Wound/Incision Assessment Intact 04/13/22 0819   Wound Thickness Description Full thickness 04/13/22 0819   Number of days: 20        Assessment:   Traumatic open wound of the left lower leg, much improved. Patient Active Problem List   Diagnosis Code    TIA (transient ischemic attack) G45.9    Hypertensive emergency I16.1    Hx of sinus bradycardia Z86.79    Paroxysmal atrial fibrillation (HCC) I48.0    Traumatic open wound of left lower leg S81.802A     Plan:     Plan to discontinue dressing changes with Dakin's solution and start dressing changes with collagen and Tubigrip. Follow-up in 2 weeks for recheck. Follow-up Information     Follow up With Specialties Details Why Contact Info    Cathy Grier MD General Surgery In 2 weeks  [facility] [address]  [phone]              Thank you very much for the opportunity to participate in this patient's care. Signed By: Alvaro Gil MD     April 13, 2022        TIME:  If total time for today's E/M service is used for level of service it is documented below.     This time includes physician non-face-to-face service time visit on the date of service and includes    Preparing to see the patient (eg, review of tests)  Obtaining and/or reviewing separately obtained history  Performing a medically necessary appropriate examination and/or evaluation  Counseling and educating the patient/family/caregiver  Ordering medications, tests, or procedures  Referring and communicating with other health care professionals as needed  Documenting clinical information in the electronic or other health record  Independently interpreting results (not reported separately) and communicating results to the patient/family/caregiver  Care coordination (not reported separately)    E/M time =    20      minutes.

## 2022-04-27 ENCOUNTER — HOSPITAL ENCOUNTER (OUTPATIENT)
Dept: WOUND CARE | Age: 72
Discharge: HOME | End: 2022-04-27
Attending: SURGERY
Payer: COMMERCIAL

## 2022-04-27 VITALS
DIASTOLIC BLOOD PRESSURE: 77 MMHG | OXYGEN SATURATION: 98 % | BODY MASS INDEX: 25.27 KG/M2 | HEIGHT: 67 IN | WEIGHT: 161 LBS | RESPIRATION RATE: 18 BRPM | HEART RATE: 54 BPM | SYSTOLIC BLOOD PRESSURE: 136 MMHG | TEMPERATURE: 98 F

## 2022-04-27 DIAGNOSIS — S81.802A TRAUMATIC OPEN WOUND OF LEFT LOWER LEG, INITIAL ENCOUNTER: Primary | ICD-10-CM

## 2022-04-27 PROCEDURE — 99212 OFFICE O/P EST SF 10 MIN: CPT | Performed by: SURGERY

## 2022-04-27 PROCEDURE — 99213 OFFICE O/P EST LOW 20 MIN: CPT

## 2022-04-27 RX ORDER — TRIAMCINOLONE ACETONIDE 1 MG/G
OINTMENT TOPICAL ONCE
Status: CANCELLED | OUTPATIENT
Start: 2022-04-27 | End: 2022-04-27

## 2022-04-27 RX ORDER — GENTAMICIN SULFATE 1 MG/G
OINTMENT TOPICAL ONCE
Status: CANCELLED | OUTPATIENT
Start: 2022-04-27 | End: 2022-04-27

## 2022-04-27 RX ORDER — SILVER SULFADIAZINE 10 G/1000G
CREAM TOPICAL ONCE
Status: CANCELLED | OUTPATIENT
Start: 2022-04-27 | End: 2022-04-27

## 2022-04-27 RX ORDER — LIDOCAINE HYDROCHLORIDE 20 MG/ML
JELLY TOPICAL ONCE
Status: CANCELLED | OUTPATIENT
Start: 2022-04-27 | End: 2022-04-27

## 2022-04-27 RX ORDER — BACITRACIN 500 [USP'U]/G
OINTMENT TOPICAL ONCE
Status: CANCELLED | OUTPATIENT
Start: 2022-04-27 | End: 2022-04-27

## 2022-04-27 RX ORDER — BETAMETHASONE DIPROPIONATE 0.5 MG/G
OINTMENT TOPICAL ONCE
Status: CANCELLED | OUTPATIENT
Start: 2022-04-27 | End: 2022-04-27

## 2022-04-27 RX ORDER — LIDOCAINE HYDROCHLORIDE 40 MG/ML
SOLUTION TOPICAL ONCE
Status: CANCELLED | OUTPATIENT
Start: 2022-04-27 | End: 2022-04-27

## 2022-04-27 RX ORDER — MUPIROCIN 20 MG/G
OINTMENT TOPICAL ONCE
Status: CANCELLED | OUTPATIENT
Start: 2022-04-27 | End: 2022-04-27

## 2022-04-27 RX ORDER — BACITRACIN ZINC AND POLYMYXIN B SULFATE 500; 1000 [USP'U]/G; [USP'U]/G
OINTMENT TOPICAL ONCE
Status: CANCELLED | OUTPATIENT
Start: 2022-04-27 | End: 2022-04-27

## 2022-04-27 RX ORDER — LIDOCAINE 40 MG/G
CREAM TOPICAL ONCE
Status: CANCELLED | OUTPATIENT
Start: 2022-04-27 | End: 2022-04-27

## 2022-04-27 RX ORDER — LIDOCAINE 50 MG/G
OINTMENT TOPICAL ONCE
Status: CANCELLED | OUTPATIENT
Start: 2022-04-27 | End: 2022-04-27

## 2022-04-27 RX ORDER — LIDOCAINE HYDROCHLORIDE 20 MG/ML
5 SOLUTION OROPHARYNGEAL ONCE
Status: CANCELLED | OUTPATIENT
Start: 2022-04-27 | End: 2022-04-27

## 2022-04-27 RX ORDER — CLOBETASOL PROPIONATE 0.5 MG/G
OINTMENT TOPICAL ONCE
Status: CANCELLED | OUTPATIENT
Start: 2022-04-27 | End: 2022-04-27

## 2022-04-27 NOTE — PROGRESS NOTES
04/27/22 0818   Wound Ankle Left;Medial   Date First Assessed/Time First Assessed: 03/24/22 1432   Present on Hospital Admission: Yes  Wound Approximate Age at First Assessment (Weeks): 5 weeks  Primary Wound Type: Traumatic  Location: Ankle  Wound Location Orientation: Left;Medial   Wound Image    Wound Etiology Traumatic   Dressing Status Old drainage noted   Cleansed Cleansed with saline   Dressing/Treatment Collagen;ABD pad;Roll gauze   Wound Length (cm) 2.5 cm   Wound Width (cm) 2 cm   Wound Depth (cm) 0.2 cm   Wound Surface Area (cm^2) 5 cm^2   Change in Wound Size % 64.29   Wound Volume (cm^3) 1 cm^3   Wound Healing % 93   Wound Assessment Granulation tissue   Drainage Amount Small   Drainage Description Serosanguinous   Wound Odor None   Reanna-Wound/Incision Assessment Intact   Wound Thickness Description Full thickness   Patient is currently taking Eliquis and Aspirin 325 mg

## 2022-04-27 NOTE — DISCHARGE INSTRUCTIONS
[address]  [address], [address]  Phone: [phone]  Fax: [phone]    Patient: Adela Ruiz. MRN: [id number]  SSN: [id number]    YOB: 1950  Age: 80 y.o. Sex: male       Return Appointment: 2 weeks with Dr. Lyubov Holbrook    Instructions: Left Medial Ankle:  Cleanse wound with normal saline. DO NOT GET WET IN SHOWER. Puracol Plus (may substitute equivalent collagen):cut to approximate wound size, apply to wound bed. Cover with ABD/ gauze 4x4. Cover with rolled gauze and tubigrip stocking. Change 3 times per week.      *Eat high protein diet and drink Robbie packet once a day to aid in wound healing.       Should you experience increased redness, swelling, pain, foul odor, size of wound(s), or have a temperature over 101 degrees please contact the [address] [place] [address] Road at [phone] or if after hours contact your primary care physician or go to the hospital emergency department.     Signed By: Lillian Maharaj, PT, [address] [facility],[address]     April 27, 2022

## 2022-04-27 NOTE — PROGRESS NOTES
[address], [address], [address]  [phone]    Progress Notes   Ely MRN: [id number]     : 1950     Age: 80 y.o. Subjective/HPI:   This patient is a 80 y.o. male seen and evaluated at the wound clinic for follow-up of traumatic wound of the left lower extremity. Patient has been receiving dressing changes with collagen for the past 2 weeks. On today's visit he denies any pain or discomfort nor is he noted any redness or drainage. He continues with full activity and reports no changes in overall health since previous visit. Review of Systems  A comprehensive review of systems was negative except for that written in the HPI. Past Medical History:   Diagnosis Date    Arrhythmia     Hypertension     managed with meds    TIA (transient ischemic attack) 2018    no residual/ [facility]T ED      Past Surgical History:   Procedure Laterality Date    HX ACL RECONSTRUCTION Left     HX CATARACT REMOVAL Right     no lens implant - traumatic injury caused cataract    HX HERNIA REPAIR Left     with mesh     HX TONSILLECTOMY        No Known Allergies   Social History     Tobacco Use    Smoking status: Never Smoker    Smokeless tobacco: Never Used   Substance Use Topics    Alcohol use: Yes     Alcohol/week: 0.0 - 1.0 standard drinks      Social History     Social History Narrative    Not on file     History reviewed. No pertinent family history. Cannot display prior to admission medications because the patient has not been admitted in this contact. Current Outpatient Medications   Medication Sig    Eliquis 5 mg tablet Take 5 mg by mouth two (2) times a day.  amLODIPine (Norvasc) 5 mg tablet Take 5 mg by mouth nightly.  aspirin (ASPIRIN) 325 mg tablet Take 325 mg by mouth daily. Holding for surgery- last dose 21    atorvastatin (LIPITOR) 40 mg tablet Take 1 Tab by mouth nightly.  lisinopril (PRINIVIL, ZESTRIL) 40 mg tablet Take 1 Tab by mouth daily. (Patient taking differently: Take 40 mg by mouth nightly.)     No current facility-administered medications for this encounter. Objective:     Vitals:    04/27/22 0812 04/27/22 0816   BP:  136/77   Pulse:  (!) 54   Resp:  18   Temp:  98 °F (36.7 °C)   SpO2:  98%   Weight: 73 kg (161 lb)    Height: 5' 7\" (1.702 m)        Physical Exam:  Ambulation: Normal  Gen- the patient is well developed and in no acute distress  HEENT- no focal abnormalities of the scalp or face. Neck- no JVD or retractions  Lungs- normal respiratory effort. Cardiovascular:  Lower limb pulses: 3+. Abd- soft and no masses evident. Ext- warm without cyanosis. There is no significant lower leg edema. Skin- no jaundice or rashes. Open wound left lower extremity appears to be filling in and measures slightly smaller, starting to develop some hypergranular tissue. Please see the specific measurements and descriptions of the wound(s) below. There is no evidence of periwound induration or warmth. No purulence or odor. Neuro- alert and oriented x 3. No gross imotor deficits are present. Lower limb sensation is intact. Psychological: Affect normal. Mood normal. Memory intact.      Wound Ankle Left;Medial (Active)   Wound Image   04/27/22 0818   Wound Etiology Traumatic 04/27/22 0818   Dressing Status Old drainage noted 04/27/22 0818   Cleansed Cleansed with saline 04/27/22 0818   Dressing/Treatment Collagen;ABD pad;Roll gauze 04/27/22 0818   Wound Length (cm) 2.5 cm 04/27/22 0818   Wound Width (cm) 2 cm 04/27/22 0818   Wound Depth (cm) 0.2 cm 04/27/22 0818   Wound Surface Area (cm^2) 5 cm^2 04/27/22 0818   Change in Wound Size % 64.29 04/27/22 0818   Wound Volume (cm^3) 1 cm^3 04/27/22 0818   Wound Healing % 93 04/27/22 0818   Post-Procedure Length (cm) 3.5 cm 03/24/22 1433   Post-Procedure Width (cm) 4 cm 03/24/22 1433   Post-Procedure Depth (cm) 1.4 cm 03/24/22 1433   Post-Procedure Surface Area (cm^2) 14 cm^2 03/24/22 1433   Post-Procedure Volume (cm^3) 19.6 cm^3 03/24/22 1433   Undermining Starts ___ O'Clock 5 o'clock 04/06/22 1135   Undermining Ends ___ O'Clock 7 o'clock 04/06/22 1135   Undermining Maximum Distance (cm) 0.5 cm 04/06/22 1135   Wound Assessment Granulation tissue 04/27/22 0818   Drainage Amount Small 04/27/22 0818   Drainage Description Serosanguinous 04/27/22 0818   Wound Odor None 04/27/22 0818   Reanna-Wound/Incision Assessment Intact 04/27/22 0818   Wound Thickness Description Full thickness 04/27/22 0818   Number of days: 34        Assessment:   Traumatic open wound of left lower leg, improving  Patient Active Problem List   Diagnosis Code    TIA (transient ischemic attack) G45.9    Hypertensive emergency I16.1    Hx of sinus bradycardia Z86.79    Paroxysmal atrial fibrillation (HCC) I48.0    Traumatic open wound of left lower leg S81.802A     Plan:   Continue dressing changes with collagen and Tubigrip, follow-up 2 weeks at which time he may need some silver nitrate. Follow-up Information    None           Thank you very much for the opportunity to participate in this patient's care. Signed By: Mitchel Hunt MD     April 27, 2022        TIME:  If total time for today's E/M service is used for level of service it is documented below.     This time includes physician non-face-to-face service time visit on the date of service and includes    Preparing to see the patient (eg, review of tests)  Obtaining and/or reviewing separately obtained history  Performing a medically necessary appropriate examination and/or evaluation  Counseling and educating the patient/family/caregiver  Ordering medications, tests, or procedures  Referring and communicating with other health care professionals as needed  Documenting clinical information in the electronic or other health record  Independently interpreting results (not reported separately) and communicating results to the patient/family/caregiver  Care coordination (not reported separately)    E/M time =     15     minutes.

## 2022-04-27 NOTE — WOUND CARE
[address]  [address], [address]  Phone: [phone]  Fax: [phone]    Patient: Hannah Goncalves MRN: [id number]  SSN: [id number]    YOB: 1950  Age: 80 y.o. Sex: male       Return Appointment: 2 weeks with Dr. Gretel Mendez    Instructions: Left Medial Ankle:  Cleanse wound with normal saline. DO NOT GET WET IN SHOWER. Puracol Plus (may substitute equivalent collagen):cut to approximate wound size, apply to wound bed. Cover with ABD/ gauze 4x4. Cover with rolled gauze and tubigrip stocking. Change 3 times per week.      *Eat high protein diet and drink Robbie packet once a day to aid in wound healing. Should you experience increased redness, swelling, pain, foul odor, size of wound(s), or have a temperature over 101 degrees please contact the [address] [place] [address] Road at [phone] or if after hours contact your primary care physician or go to the hospital emergency department.     Signed By: Amelia Salgado, PT, [address] [facility],[address]     April 27, 2022

## 2022-05-11 ENCOUNTER — HOSPITAL ENCOUNTER (OUTPATIENT)
Dept: WOUND CARE | Age: 72
Discharge: HOME | End: 2022-05-11
Attending: SURGERY
Payer: COMMERCIAL

## 2022-05-11 VITALS
RESPIRATION RATE: 18 BRPM | BODY MASS INDEX: 25.27 KG/M2 | WEIGHT: 161 LBS | HEIGHT: 67 IN | HEART RATE: 56 BPM | SYSTOLIC BLOOD PRESSURE: 134 MMHG | TEMPERATURE: 98.1 F | DIASTOLIC BLOOD PRESSURE: 80 MMHG

## 2022-05-11 DIAGNOSIS — S81.802D TRAUMATIC OPEN WOUND OF LEFT LOWER LEG, SUBSEQUENT ENCOUNTER: ICD-10-CM

## 2022-05-11 DIAGNOSIS — S81.802A TRAUMATIC OPEN WOUND OF LEFT LOWER LEG, INITIAL ENCOUNTER: Primary | ICD-10-CM

## 2022-05-11 PROCEDURE — 99213 OFFICE O/P EST LOW 20 MIN: CPT | Performed by: SURGERY

## 2022-05-11 PROCEDURE — 99213 OFFICE O/P EST LOW 20 MIN: CPT

## 2022-05-11 RX ORDER — LIDOCAINE HYDROCHLORIDE 20 MG/ML
5 SOLUTION OROPHARYNGEAL ONCE
OUTPATIENT
Start: 2022-05-11 | End: 2022-05-11

## 2022-05-11 RX ORDER — MUPIROCIN 20 MG/G
OINTMENT TOPICAL ONCE
OUTPATIENT
Start: 2022-05-11 | End: 2022-05-11

## 2022-05-11 RX ORDER — LIDOCAINE 50 MG/G
OINTMENT TOPICAL ONCE
OUTPATIENT
Start: 2022-05-11 | End: 2022-05-11

## 2022-05-11 RX ORDER — BETAMETHASONE DIPROPIONATE 0.5 MG/G
OINTMENT TOPICAL ONCE
OUTPATIENT
Start: 2022-05-11 | End: 2022-05-11

## 2022-05-11 RX ORDER — LIDOCAINE HYDROCHLORIDE 40 MG/ML
SOLUTION TOPICAL ONCE
OUTPATIENT
Start: 2022-05-11 | End: 2022-05-11

## 2022-05-11 RX ORDER — LIDOCAINE HYDROCHLORIDE 20 MG/ML
JELLY TOPICAL ONCE
OUTPATIENT
Start: 2022-05-11 | End: 2022-05-11

## 2022-05-11 RX ORDER — LIDOCAINE 40 MG/G
CREAM TOPICAL ONCE
OUTPATIENT
Start: 2022-05-11 | End: 2022-05-11

## 2022-05-11 RX ORDER — BACITRACIN 500 [USP'U]/G
OINTMENT TOPICAL ONCE
OUTPATIENT
Start: 2022-05-11 | End: 2022-05-11

## 2022-05-11 RX ORDER — CLOBETASOL PROPIONATE 0.5 MG/G
OINTMENT TOPICAL ONCE
OUTPATIENT
Start: 2022-05-11 | End: 2022-05-11

## 2022-05-11 RX ORDER — SILVER SULFADIAZINE 10 G/1000G
CREAM TOPICAL ONCE
OUTPATIENT
Start: 2022-05-11 | End: 2022-05-11

## 2022-05-11 RX ORDER — BACITRACIN ZINC AND POLYMYXIN B SULFATE 500; 1000 [USP'U]/G; [USP'U]/G
OINTMENT TOPICAL ONCE
OUTPATIENT
Start: 2022-05-11 | End: 2022-05-11

## 2022-05-11 RX ORDER — GENTAMICIN SULFATE 1 MG/G
OINTMENT TOPICAL ONCE
OUTPATIENT
Start: 2022-05-11 | End: 2022-05-11

## 2022-05-11 RX ORDER — TRIAMCINOLONE ACETONIDE 1 MG/G
OINTMENT TOPICAL ONCE
OUTPATIENT
Start: 2022-05-11 | End: 2022-05-11

## 2022-05-11 NOTE — DISCHARGE INSTRUCTIONS
[address]  [address], [address]   Phone: [phone]  Fax: [phone]    Patient: Adela Ruiz. MRN: [id number]  SSN: [id number]    YOB: 1950  Age: 80 y.o. Sex: male       Return Appointment: 2 weeks with Lyubov Holbrook MD    Instructions: Left Medial Ankle:  Cleanse wound with normal saline. DO NOT GET WET IN SHOWER. Puracol Plus (may substitute equivalent collagen):cut to approximate wound size, apply to wound bed. Cover with gauze and covrsite. Tubigrip to left leg. Change 3 times per week.      *Eat high protein diet and drink Robbie packet once a day to aid in wound healing. Should you experience increased redness, swelling, pain, foul odor, size of wound(s), or have a temperature over 101 degrees please contact the [address] [place] [address] Road at [phone] or if after hours contact your primary care physician or go to the hospital emergency department.     Signed By: Sherri Liu     May 11, 2022

## 2022-05-11 NOTE — WOUND CARE
05/11/22 7350   Wound Ankle Left;Medial   Date First Assessed/Time First Assessed: 03/24/22 1432   Present on Hospital Admission: Yes  Wound Approximate Age at First Assessment (Weeks): 5 weeks  Primary Wound Type: Traumatic  Location: Ankle  Wound Location Orientation: Left;Medial   Wound Image    Wound Etiology Traumatic   Dressing Status Clean;Dry; Intact   Cleansed Cleansed with saline   Dressing/Treatment Collagen  (roll gauze)   Wound Length (cm) 1.9 cm   Wound Width (cm) 1.3 cm   Wound Depth (cm) 0.1 cm   Wound Surface Area (cm^2) 2.47 cm^2   Change in Wound Size % 82.36   Wound Volume (cm^3) 0.247 cm^3   Wound Healing % 98   Wound Assessment Granulation tissue   Drainage Amount Small   Drainage Description Serosanguinous   Wound Odor None   Reanna-Wound/Incision Assessment Intact   Wound Thickness Description Full thickness   Patient is on asa and eliquis.

## 2022-05-11 NOTE — PROGRESS NOTES
[address], [address], [address] [facility] [address]  [phone]    Progress Notes   Ely MRN: [id number]     : 1950     Age: 80 y.o. Subjective/HPI:   This patient is a 80 y.o. male seen and evaluated at the wound clinic for follow-up of traumatic wound of the left lower extremity that occurred 2-1/2 months ago. Patient continues to receive dressing changes with collagen. On today's exam he is without complaints. He reports some occasional slight drainage but he is not having any pain or discomfort nor has he noted any signs of infection. He continues to with full activity and reports no changes in overall health since previous visit. Review of Systems  A comprehensive review of systems was negative except for that written in the HPI. Past Medical History:   Diagnosis Date    Arrhythmia     Hypertension     managed with meds    TIA (transient ischemic attack) 2018    no residual/ [facility]T ED      Past Surgical History:   Procedure Laterality Date    HX ACL RECONSTRUCTION Left     HX CATARACT REMOVAL Right     no lens implant - traumatic injury caused cataract    HX HERNIA REPAIR Left     with mesh     HX TONSILLECTOMY        No Known Allergies   Social History     Tobacco Use    Smoking status: Never Smoker    Smokeless tobacco: Never Used   Substance Use Topics    Alcohol use: Yes     Alcohol/week: 0.0 - 1.0 standard drinks      Social History     Social History Narrative    Not on file     History reviewed. No pertinent family history. Cannot display prior to admission medications because the patient has not been admitted in this contact. Current Outpatient Medications   Medication Sig    Eliquis 5 mg tablet Take 5 mg by mouth two (2) times a day.  amLODIPine (Norvasc) 5 mg tablet Take 5 mg by mouth nightly.  aspirin (ASPIRIN) 325 mg tablet Take 325 mg by mouth daily.  Holding for surgery- last dose 4/7/21    atorvastatin (LIPITOR) 40 mg tablet Take 1 Tab by mouth nightly.  lisinopril (PRINIVIL, ZESTRIL) 40 mg tablet Take 1 Tab by mouth daily. (Patient taking differently: Take 40 mg by mouth nightly.)     No current facility-administered medications for this encounter. Objective:     Vitals:    05/11/22 0826   BP: 134/80   Pulse: (!) 56   Resp: 18   Temp: 98.1 °F (36.7 °C)   Weight: 73 kg (161 lb)   Height: 5' 7\" (1.702 m)       Physical Exam:  Ambulation: Normal  Gen- the patient is well developed and in no acute distress  HEENT- no focal abnormalities of the scalp or face. Neck- no JVD or retractions  Lungs- normal respiratory effort. Cardiovascular:  Lower limb pulses: 3+. Abd- soft and no masses evident. Ext- warm without cyanosis. There is no significant lower leg edema. Skin- no jaundice or rashes. Open wound left lower extremity with good granulation tissue, measures smaller, new epithelialization around perimeter. Please see the specific measurements and descriptions of the wound(s) below. There is no evidence of periwound induration or warmth. No purulence or odor. Neuro- alert and oriented x 3. No gross motor deficits are present. Lower limb sensation is intact. Psychological: Affect normal. Mood normal. Memory intact. Wound Ankle Left;Medial (Active)   Wound Image   05/11/22 0832   Wound Etiology Traumatic 05/11/22 6673   Dressing Status Clean;Dry; Intact 05/11/22 0832   Cleansed Cleansed with saline 05/11/22 0832   Dressing/Treatment Collagen 05/11/22 0832   Wound Length (cm) 1.9 cm 05/11/22 0832   Wound Width (cm) 1.3 cm 05/11/22 0832   Wound Depth (cm) 0.1 cm 05/11/22 0832   Wound Surface Area (cm^2) 2.47 cm^2 05/11/22 0832   Change in Wound Size % 82.36 05/11/22 0832   Wound Volume (cm^3) 0.247 cm^3 05/11/22 0832   Wound Healing % 98 05/11/22 0832   Post-Procedure Length (cm) 3.5 cm 03/24/22 1433   Post-Procedure Width (cm) 4 cm 03/24/22 3947 Post-Procedure Depth (cm) 1.4 cm 03/24/22 1433   Post-Procedure Surface Area (cm^2) 14 cm^2 03/24/22 1433   Post-Procedure Volume (cm^3) 19.6 cm^3 03/24/22 1433   Undermining Starts ___ O'Clock 5 o'clock 04/06/22 1135   Undermining Ends ___ O'Clock 7 o'clock 04/06/22 1135   Undermining Maximum Distance (cm) 0.5 cm 04/06/22 1135   Wound Assessment Granulation tissue 05/11/22 0832   Drainage Amount Small 05/11/22 0832   Drainage Description Serosanguinous 05/11/22 0832   Wound Odor None 05/11/22 0832   Reanna-Wound/Incision Assessment Intact 05/11/22 0832   Wound Thickness Description Full thickness 05/11/22 0832   Number of days: 48        Assessment:   Traumatic open wound of the left lower leg, continues to slowly improve  Patient Active Problem List   Diagnosis Code    TIA (transient ischemic attack) G45.9    Hypertensive emergency I16.1    Hx of sinus bradycardia Z86.79    Paroxysmal atrial fibrillation (HCC) I48.0    Traumatic open wound of left lower leg S81.802A     Plan:   Continue dressing changes with collagen for now. TheraSkin application would speed up the healing therefore will see if his insurance will cover this product. Follow-up in 2 weeks for recheck. Follow-up Information     Follow up With Specialties Details Why Contact Info    [address] In 2 weeks  [address] [place] [address] [place], [place] [id number] [phone]            Thank you very much for the opportunity to participate in this patient's care. Signed By: Andrea Alberto MD     May 11, 2022        TIME:  If total time for today's E/M service is used for level of service it is documented below.     This time includes physician non-face-to-face service time visit on the date of service and includes    Preparing to see the patient (eg, review of tests)  Obtaining and/or reviewing separately obtained history  Performing a medically necessary appropriate examination and/or evaluation  Counseling and educating the patient/family/caregiver  Ordering medications, tests, or procedures  Referring and communicating with other health care professionals as needed  Documenting clinical information in the electronic or other health record  Independently interpreting results (not reported separately) and communicating results to the patient/family/caregiver  Care coordination (not reported separately)    E/M time =       20 minutes.

## 2022-05-11 NOTE — WOUND CARE
[address]  [address], [address]  [place]   Phone: [phone]  Fax: [phone]    Patient: Amisha Colorado. MRN: [id number]  SSN: [id number]    YOB: 1950  Age: 80 y.o. Sex: male       Return Appointment: 2 weeks with Rico Andrade MD    Instructions: Left Medial Ankle:  Cleanse wound with normal saline. DO NOT GET WET IN SHOWER. Puracol Plus (may substitute equivalent collagen):cut to approximate wound size, apply to wound bed. Cover with gauze and covrsite. Tubigrip to left leg. Change 3 times per week.      *Eat high protein diet and drink Robbie packet once a day to aid in wound healing. Should you experience increased redness, swelling, pain, foul odor, size of wound(s), or have a temperature over 101 degrees please contact the [address] [place] [address] Road at [phone] or if after hours contact your primary care physician or go to the hospital emergency department.     Signed By: Caity Harper     May 11, 2022

## 2022-05-25 ENCOUNTER — HOSPITAL ENCOUNTER (OUTPATIENT)
Dept: WOUND CARE | Age: 72
Setting detail: RECURRING SERIES
Discharge: HOME | End: 2022-05-25
Payer: COMMERCIAL

## 2022-05-25 VITALS
SYSTOLIC BLOOD PRESSURE: 139 MMHG | DIASTOLIC BLOOD PRESSURE: 87 MMHG | BODY MASS INDEX: 25.11 KG/M2 | HEART RATE: 58 BPM | WEIGHT: 160 LBS | HEIGHT: 67 IN | TEMPERATURE: 97.7 F

## 2022-05-25 PROCEDURE — 17250 CHEM CAUT OF GRANLTJ TISSUE: CPT

## 2022-05-25 PROCEDURE — 17250 CHEM CAUT OF GRANLTJ TISSUE: CPT | Performed by: SURGERY

## 2022-05-25 PROCEDURE — 6370000000 HC RX 637 (ALT 250 FOR IP): Performed by: SURGERY

## 2022-05-25 PROCEDURE — 99213 OFFICE O/P EST LOW 20 MIN: CPT | Performed by: SURGERY

## 2022-05-25 RX ORDER — LIDOCAINE HYDROCHLORIDE 40 MG/ML
SOLUTION TOPICAL ONCE
Status: CANCELLED | OUTPATIENT
Start: 2022-05-25 | End: 2022-05-25

## 2022-05-25 RX ORDER — GENTAMICIN SULFATE 1 MG/G
OINTMENT TOPICAL ONCE
Status: CANCELLED | OUTPATIENT
Start: 2022-05-25 | End: 2022-05-25

## 2022-05-25 RX ORDER — BACITRACIN ZINC AND POLYMYXIN B SULFATE 500; 1000 [USP'U]/G; [USP'U]/G
OINTMENT TOPICAL ONCE
Status: CANCELLED | OUTPATIENT
Start: 2022-05-25 | End: 2022-05-25

## 2022-05-25 RX ORDER — LIDOCAINE 40 MG/G
CREAM TOPICAL ONCE
Status: CANCELLED | OUTPATIENT
Start: 2022-05-25 | End: 2022-05-25

## 2022-05-25 RX ORDER — BACITRACIN, NEOMYCIN, POLYMYXIN B 400; 3.5; 5 [USP'U]/G; MG/G; [USP'U]/G
OINTMENT TOPICAL ONCE
Status: CANCELLED | OUTPATIENT
Start: 2022-05-25 | End: 2022-05-25

## 2022-05-25 RX ORDER — CLOBETASOL PROPIONATE 0.5 MG/G
OINTMENT TOPICAL ONCE
Status: CANCELLED | OUTPATIENT
Start: 2022-05-25 | End: 2022-05-25

## 2022-05-25 RX ORDER — BETAMETHASONE DIPROPIONATE 0.05 %
OINTMENT (GRAM) TOPICAL ONCE
Status: CANCELLED | OUTPATIENT
Start: 2022-05-25 | End: 2022-05-25

## 2022-05-25 RX ORDER — GINSENG 100 MG
CAPSULE ORAL ONCE
Status: CANCELLED | OUTPATIENT
Start: 2022-05-25 | End: 2022-05-25

## 2022-05-25 RX ORDER — LIDOCAINE HYDROCHLORIDE 20 MG/ML
JELLY TOPICAL ONCE
Status: CANCELLED | OUTPATIENT
Start: 2022-05-25 | End: 2022-05-25

## 2022-05-25 RX ORDER — LIDOCAINE 50 MG/G
OINTMENT TOPICAL ONCE
Status: CANCELLED | OUTPATIENT
Start: 2022-05-25 | End: 2022-05-25

## 2022-05-25 RX ADMIN — SILVER NITRATE 1 EACH: 38.21; 12.74 STICK TOPICAL at 08:41

## 2022-05-25 NOTE — PROGRESS NOTES
[address], [address] [place] [address], [address] [place] [address] Rd  [phone]    Progress Notes   Felice MRN: [id number]     : 1950     Age: 80 y.o. Subjective/HPI:   This patient is a 80 y.o. male seen and evaluated at the wound clinic for follow-up of traumatic wound of the left lower extremity that occurred approximately 3 months ago. Patient has been receiving dressing changes with collagen. On today's visit he is without complaints and denies any unusual pain or discomfort. He has occasional slight drainage but has not noted any erythema or purulence. He continues with full activity and reports no changes in overall health. Review of Systems  Pertinent items are noted in HPI. Past Medical History:   Diagnosis Date    Arrhythmia     Hypertension     managed with meds    TIA (transient ischemic attack) 2018    no residual/ [facility] ED      Past Surgical History:   Procedure Laterality Date    ANTERIOR CRUCIATE LIGAMENT REPAIR Left     CATARACT REMOVAL Right     no lens implant - traumatic injury caused cataract    HERNIA REPAIR Left     with mesh     TONSILLECTOMY        No Known Allergies   Social History     Tobacco Use    Smoking status: Never Smoker    Smokeless tobacco: Never Used   Substance Use Topics    Alcohol use: Yes     Alcohol/week: 0.0 - 1.0 standard drinks      Social History     Social History Narrative    Not on file     History reviewed. No pertinent family history.    [unfilled]  Current Outpatient Medications   Medication Sig    amLODIPine (NORVASC) 5 MG tablet Take 5 mg by mouth    apixaban (ELIQUIS) 5 MG TABS tablet Take 5 mg by mouth 2 times daily    aspirin 325 MG tablet Take 325 mg by mouth daily    atorvastatin (LIPITOR) 40 MG tablet Take 40 mg by mouth    lisinopril (PRINIVIL;ZESTRIL) 40 MG tablet Take 40 mg by mouth daily     No current facility-administered medications for this encounter. Objective:     Vitals:    05/25/22 0805 05/25/22 0815   BP:  139/87   Pulse:  58   Temp:  97.7 °F (36.5 °C)   TempSrc:  Oral   Weight: 160 lb (72.6 kg)    Height: 5' 7\" (1.702 m)        Physical Exam:  Ambulation: Normal  Gen- the patient is well developed and in no acute distress  HEENT- no focal abnormalities of the scalp or face. Neck- no JVD or retractions  Lungs- normal respiratory effort. Cardiovascular:  Lower limb pulses: 3+. Abd- soft and no masses evident. Ext- warm without cyanosis. There is no lower leg edema. Skin- no jaundice or rashes. Open wound left lower extremity measures smaller, hypergranulation tissue. Please see the specific measurements and descriptions of the wound(s) below. There is no evidence of periwound induration or warmth. No purulence or odor. Neuro- alert and oriented x 3. No gross motor deficits are present. Lower limb sensation is intact. Psychological: Affect normal. Mood normal. Memory intact. Assessment:   Traumatic open wound left lower extremity, continues to improve  Patient Active Problem List   Diagnosis    TIA (transient ischemic attack)    Hx of sinus bradycardia    Paroxysmal atrial fibrillation ([facility] [address]    Hypertensive emergency    Traumatic open wound of left lower leg     Plan:   Plan to discontinue collagen, apply silver nitrate to hypergranulation tissue, start dressing changes with Xeroform gauze, follow-up in 2 weeks for recheck. [unfilled]      Thank you very much for the opportunity to participate in this patient's care. [unfilled]    TIME:  If total time for today's E/M service is used for level of service it is documented below.     This time includes physician non-face-to-face service time visit on the date of service and includes    Preparing to see the patient (eg, review of tests)  Obtaining and/or reviewing separately obtained history  Performing a medically necessary appropriate examination and/or evaluation  Counseling and educating the patient/family/caregiver  Ordering medications, tests, or procedures  Referring and communicating with other health care professionals as needed  Documenting clinical information in the electronic or other health record  Independently interpreting results (not reported separately) and communicating results to the patient/family/caregiver  Care coordination (not reported separately)    E/M time = 20        minutes.

## 2022-05-25 NOTE — FLOWSHEET NOTE
05/25/22 0819   Wound 03/24/22 Ankle Left;Medial #1   Date First Assessed/Time First Assessed: 03/24/22 1125   Present on Hospital Admission: Yes  Primary Wound Type: Traumatic  Location: Ankle  Wound Location Orientation: Left;Medial  Wound Description (Comments): #1   Wound Image    Wound Etiology Traumatic   Dressing Status Clean;Dry; Intact   Wound Cleansed Cleansed with saline   Dressing/Treatment Collagen   Wound Length (cm) 1.4 cm   Wound Width (cm) 0.8 cm   Wound Depth (cm) 0.1 cm   Wound Surface Area (cm^2) 1.12 cm^2   Wound Volume (cm^3) 0.112 cm^3   Drainage Amount Scant   Drainage Description Serosanguinous   Odor None   Chari-wound Assessment Edematous   Margins Flat/open edges   Wound Thickness Description not for Pressure Injury Full thickness   patient taking ASA

## 2022-05-25 NOTE — WOUND CARE
Discharge Instructions for  Hudson Carvalho  [address] [place] [address] [address], [address] [facility] [address]  Phone [phone]   Fax [phone]      NAME:  Smita Quintanilla. YOB: 1950  MEDICAL RECORD NUMBER:  [id number]  DATE:  5/25/2022    Return Appointment:   2 weeks with Dr. Chelly Josue MD      Instructions: left medial lower leg wound:  Cleanse with normal saline. Treat wound edges with skin prep as needed to prevent maceration. Cut Xeroform to fit wound bed. Use rolled gauze to secure Xeroform to leg. Change dressing daily. Keep dressing dry. Cover with Tubigrip stockinet to reduce swelling. Elevate as needed.          Electronically signed Deirdre Desai RN on 5/25/2022 at 8:46 AM

## 2022-05-25 NOTE — PROGRESS NOTES
Chemical Cautery    Performed by: Pamela Lynch MD    Consent obtained? Yes    Time out taken: Yes    Tissue: Hypergranulation tissue    Method: Silver nitrate    Location of Chemical Cautery:     Wound/Ulcer Number: 1           Procedural Pain: 0 / 10     Post Procedural Pain: 0 / 10     Response to treatment: Seizure well-tolerated by patient.

## 2022-05-25 NOTE — FLOWSHEET NOTE
05/25/22 0819   Wound 03/24/22 Ankle Left;Medial #1   Date First Assessed/Time First Assessed: 03/24/22 1125   Present on Hospital Admission: Yes  Primary Wound Type: Traumatic  Location: Ankle  Wound Location Orientation: Left;Medial  Wound Description (Comments): #1   Wound Image    Wound Etiology Traumatic   Dressing Status Clean;Dry; Intact   Wound Cleansed Cleansed with saline   Dressing/Treatment Collagen   Wound Length (cm) 1.4 cm   Wound Width (cm) 0.8 cm   Wound Depth (cm) 0.1 cm   Wound Surface Area (cm^2) 1.12 cm^2   Wound Volume (cm^3) 0.112 cm^3   Post-Procedure Length (cm) 1.4 cm   Post-Procedure Width (cm) 0.8 cm   Post-Procedure Depth (cm) 0.1 cm   Post-Procedure Surface Area (cm^2) 1.12 cm^2   Post-Procedure Volume (cm^3) 0.112 cm^3   Drainage Amount Scant   Drainage Description Serosanguinous   Odor None   Chari-wound Assessment Edematous   Margins Flat/open edges   Wound Thickness Description not for Pressure Injury Full thickness   post debridement

## 2022-06-06 ENCOUNTER — TELEPHONE (OUTPATIENT)
Dept: CARDIOLOGY CLINIC | Age: 72
End: 2022-06-06

## 2022-06-06 NOTE — TELEPHONE ENCOUNTER
Have him see Dr. Taisha Cerda to discuss A. fib ablation please. Continue current meds since he is rate controlled and anticoagulated. Make sure he is taking Eliquis.

## 2022-06-06 NOTE — TELEPHONE ENCOUNTER
Pt c/o back in A Fib since yesterday when exercising. HR 50-51 bpm.  Hydrating well. ASX  On Eliquis 5mg BID. Pt asks if he needs to see EP or other recs?  cgh

## 2022-06-08 ENCOUNTER — INITIAL CONSULT (OUTPATIENT)
Dept: CARDIOLOGY CLINIC | Age: 72
End: 2022-06-08
Payer: COMMERCIAL

## 2022-06-08 ENCOUNTER — TELEPHONE (OUTPATIENT)
Dept: CARDIOLOGY CLINIC | Age: 72
End: 2022-06-08

## 2022-06-08 ENCOUNTER — HOSPITAL ENCOUNTER (OUTPATIENT)
Dept: WOUND CARE | Age: 72
Setting detail: RECURRING SERIES
Discharge: HOME | End: 2022-06-08
Payer: COMMERCIAL

## 2022-06-08 VITALS
HEIGHT: 67 IN | WEIGHT: 160 LBS | DIASTOLIC BLOOD PRESSURE: 92 MMHG | SYSTOLIC BLOOD PRESSURE: 144 MMHG | BODY MASS INDEX: 25.11 KG/M2 | HEART RATE: 69 BPM

## 2022-06-08 VITALS
TEMPERATURE: 97.9 F | HEART RATE: 63 BPM | WEIGHT: 160 LBS | DIASTOLIC BLOOD PRESSURE: 90 MMHG | SYSTOLIC BLOOD PRESSURE: 131 MMHG | HEIGHT: 67 IN | BODY MASS INDEX: 25.11 KG/M2

## 2022-06-08 DIAGNOSIS — I48.19 PERSISTENT ATRIAL FIBRILLATION (HCC): Primary | ICD-10-CM

## 2022-06-08 DIAGNOSIS — S81.802D TRAUMATIC OPEN WOUND OF LEFT LOWER LEG, SUBSEQUENT ENCOUNTER: Primary | ICD-10-CM

## 2022-06-08 DIAGNOSIS — I10 PRIMARY HYPERTENSION: ICD-10-CM

## 2022-06-08 DIAGNOSIS — G45.9 TIA (TRANSIENT ISCHEMIC ATTACK): ICD-10-CM

## 2022-06-08 PROCEDURE — 1123F ACP DISCUSS/DSCN MKR DOCD: CPT | Performed by: INTERNAL MEDICINE

## 2022-06-08 PROCEDURE — 99205 OFFICE O/P NEW HI 60 MIN: CPT | Performed by: INTERNAL MEDICINE

## 2022-06-08 PROCEDURE — G8417 CALC BMI ABV UP PARAM F/U: HCPCS | Performed by: INTERNAL MEDICINE

## 2022-06-08 PROCEDURE — G8427 DOCREV CUR MEDS BY ELIG CLIN: HCPCS | Performed by: INTERNAL MEDICINE

## 2022-06-08 PROCEDURE — 99212 OFFICE O/P EST SF 10 MIN: CPT

## 2022-06-08 PROCEDURE — 3017F COLORECTAL CA SCREEN DOC REV: CPT | Performed by: INTERNAL MEDICINE

## 2022-06-08 PROCEDURE — 99212 OFFICE O/P EST SF 10 MIN: CPT | Performed by: SURGERY

## 2022-06-08 PROCEDURE — 93000 ELECTROCARDIOGRAM COMPLETE: CPT | Performed by: INTERNAL MEDICINE

## 2022-06-08 PROCEDURE — 1036F TOBACCO NON-USER: CPT | Performed by: INTERNAL MEDICINE

## 2022-06-08 RX ORDER — LIDOCAINE HYDROCHLORIDE 20 MG/ML
JELLY TOPICAL ONCE
Status: CANCELLED | OUTPATIENT
Start: 2022-06-08 | End: 2022-06-08

## 2022-06-08 RX ORDER — LIDOCAINE 40 MG/G
CREAM TOPICAL ONCE
OUTPATIENT
Start: 2022-06-08 | End: 2022-06-08

## 2022-06-08 RX ORDER — BETAMETHASONE DIPROPIONATE 0.05 %
OINTMENT (GRAM) TOPICAL ONCE
Status: CANCELLED | OUTPATIENT
Start: 2022-06-08 | End: 2022-06-08

## 2022-06-08 RX ORDER — LIDOCAINE HYDROCHLORIDE 40 MG/ML
SOLUTION TOPICAL ONCE
Status: CANCELLED | OUTPATIENT
Start: 2022-06-08 | End: 2022-06-08

## 2022-06-08 RX ORDER — GINSENG 100 MG
CAPSULE ORAL ONCE
OUTPATIENT
Start: 2022-06-08 | End: 2022-06-08

## 2022-06-08 RX ORDER — CLOBETASOL PROPIONATE 0.5 MG/G
OINTMENT TOPICAL ONCE
Status: CANCELLED | OUTPATIENT
Start: 2022-06-08 | End: 2022-06-08

## 2022-06-08 RX ORDER — CLOBETASOL PROPIONATE 0.5 MG/G
OINTMENT TOPICAL ONCE
OUTPATIENT
Start: 2022-06-08 | End: 2022-06-08

## 2022-06-08 RX ORDER — BACITRACIN, NEOMYCIN, POLYMYXIN B 400; 3.5; 5 [USP'U]/G; MG/G; [USP'U]/G
OINTMENT TOPICAL ONCE
Status: CANCELLED | OUTPATIENT
Start: 2022-06-08 | End: 2022-06-08

## 2022-06-08 RX ORDER — LIDOCAINE 50 MG/G
OINTMENT TOPICAL ONCE
Status: CANCELLED | OUTPATIENT
Start: 2022-06-08 | End: 2022-06-08

## 2022-06-08 RX ORDER — LIDOCAINE HYDROCHLORIDE 40 MG/ML
SOLUTION TOPICAL ONCE
OUTPATIENT
Start: 2022-06-08 | End: 2022-06-08

## 2022-06-08 RX ORDER — LIDOCAINE 40 MG/G
CREAM TOPICAL ONCE
Status: CANCELLED | OUTPATIENT
Start: 2022-06-08 | End: 2022-06-08

## 2022-06-08 RX ORDER — GENTAMICIN SULFATE 1 MG/G
OINTMENT TOPICAL ONCE
Status: CANCELLED | OUTPATIENT
Start: 2022-06-08 | End: 2022-06-08

## 2022-06-08 RX ORDER — GENTAMICIN SULFATE 1 MG/G
OINTMENT TOPICAL ONCE
OUTPATIENT
Start: 2022-06-08 | End: 2022-06-08

## 2022-06-08 RX ORDER — GINSENG 100 MG
CAPSULE ORAL ONCE
Status: CANCELLED | OUTPATIENT
Start: 2022-06-08 | End: 2022-06-08

## 2022-06-08 RX ORDER — LIDOCAINE 50 MG/G
OINTMENT TOPICAL ONCE
OUTPATIENT
Start: 2022-06-08 | End: 2022-06-08

## 2022-06-08 RX ORDER — BETAMETHASONE DIPROPIONATE 0.05 %
OINTMENT (GRAM) TOPICAL ONCE
OUTPATIENT
Start: 2022-06-08 | End: 2022-06-08

## 2022-06-08 RX ORDER — BACITRACIN ZINC AND POLYMYXIN B SULFATE 500; 1000 [USP'U]/G; [USP'U]/G
OINTMENT TOPICAL ONCE
Status: CANCELLED | OUTPATIENT
Start: 2022-06-08 | End: 2022-06-08

## 2022-06-08 RX ORDER — BACITRACIN ZINC AND POLYMYXIN B SULFATE 500; 1000 [USP'U]/G; [USP'U]/G
OINTMENT TOPICAL ONCE
OUTPATIENT
Start: 2022-06-08 | End: 2022-06-08

## 2022-06-08 RX ORDER — LIDOCAINE HYDROCHLORIDE 20 MG/ML
JELLY TOPICAL ONCE
OUTPATIENT
Start: 2022-06-08 | End: 2022-06-08

## 2022-06-08 RX ORDER — BACITRACIN, NEOMYCIN, POLYMYXIN B 400; 3.5; 5 [USP'U]/G; MG/G; [USP'U]/G
OINTMENT TOPICAL ONCE
OUTPATIENT
Start: 2022-06-08 | End: 2022-06-08

## 2022-06-08 NOTE — FLOWSHEET NOTE
06/08/22 0832   Wound 03/24/22 Ankle Left;Medial #1   Date First Assessed/Time First Assessed: 03/24/22 1125   Present on Hospital Admission: Yes  Primary Wound Type: Traumatic  Location: Ankle  Wound Location Orientation: Left;Medial  Wound Description (Comments): #1   Wound Image    Wound Etiology Traumatic   Dressing Status Intact   Wound Cleansed Cleansed with saline   Dressing/Treatment Xeroform   Wound Length (cm) 0 cm   Wound Width (cm) 0 cm   Wound Depth (cm) 0 cm   Wound Surface Area (cm^2) 0 cm^2   Change in Wound Size % (l*w) 100   Wound Volume (cm^3) 0 cm^3   Wound Healing % 100   Wound Assessment Epithelialization   Drainage Amount None   Odor None   Chari-wound Assessment Edematous     Patient is currently taking aspirin daily

## 2022-06-08 NOTE — PROGRESS NOTES
[address], [address] [facility], [address] [place] [address]  PHONE: RACHEL [address].  1950    Chief Complant:    Chief Complaint   Patient presents with    Hypertension     consult    Irregular Heart Beat      Consultation is requested by [unfilled] for evaluation of Hypertension (consult) and Irregular Heart Beat    Reason for Consultation: persistent atrial fibrillation     History:  Marjorie Victoria is a very pleasant 80 y.o. male with a past medical and cardiac history significant for HTN, TIA and newly recognized atrial fibrillation presenting for EP consultation for atrial fibrillation. Pt is very active, cycles, and has noted worsening SOB, MCGUIRE over the past few weeks with associated fatigue and has noted increased HR and decreased stamina. He has SHER guided DCCV last year 12/21 and has had recent recurrence of AF. Cardiac PMH: (Old records have been reviewed and summarized below)  TTE (12/21): EF 60-65%, mod TR    Reviewed office note Dr. Nhan Thomas 4/6/22    Past Medical History, Past Surgical History, Family history, Social History, and Medications were all reviewed with the patient today and updated as necessary. Current Outpatient Medications   Medication Sig Dispense Refill    amLODIPine (NORVASC) 5 MG tablet Take 5 mg by mouth      apixaban (ELIQUIS) 5 MG TABS tablet Take 5 mg by mouth 2 times daily      aspirin 325 MG tablet Take 325 mg by mouth daily      atorvastatin (LIPITOR) 40 MG tablet Take 40 mg by mouth      lisinopril (PRINIVIL;ZESTRIL) 40 MG tablet Take 40 mg by mouth daily       No current facility-administered medications for this visit.      No Known Allergies  [unfilled]    Past Medical History:   Diagnosis Date    Arrhythmia     Hypertension     managed with meds    TIA (transient ischemic attack) 03/2018    no residual/ [facility]T ED     Past Surgical History:   Procedure Laterality Date    ANTERIOR CRUCIATE LIGAMENT REPAIR Left 1986    CATARACT REMOVAL Right 1990s    no lens implant - traumatic injury caused cataract    HERNIA REPAIR Left 1980s    with mesh     TONSILLECTOMY  1956     History reviewed. No pertinent family history. Social History     Tobacco Use    Smoking status: Never Smoker    Smokeless tobacco: Never Used   Substance Use Topics    Alcohol use: Yes     Alcohol/week: 0.0 - 1.0 standard drinks     PHYSICAL EXAM:   BP (!) 144/92   Pulse 69   Ht 5' 7\" (1.702 m)   Wt 160 lb (72.6 kg)   BMI 25.06 kg/m²      Wt Readings from Last 3 Encounters:   06/08/22 160 lb (72.6 kg)   06/08/22 160 lb (72.6 kg)   05/25/22 160 lb (72.6 kg)     BP Readings from Last 3 Encounters:   06/08/22 (!) 144/92   06/08/22 (!) 131/90   05/25/22 139/87       Gen: well appearing, well developed, NAD  Eyes: Pupils equal, EOMI  CV: RRR, no M/R/G, normal JVD, normal distal pulses, no EZEQUIEL  Pulm: CTAB, no accessory muscle uses, no wheezes, crackles  GI: soft, NT, ND  Neuro: Alert and oriented    Medical problems and test results were reviewed with the patient today. No results found for any visits on 06/08/22. EKG:  (EKG has been independently visualized by me with interpretation below)  Atrial fibrillation, rate 69  Low voltage in limb leads.    -Poor R-wave progression  NSST    Jude Dixon was seen today for hypertension and irregular heart beat. Diagnoses and all orders for this visit:    Persistent atrial fibrillation ([address]  -     EKG 12 lead  -     Case Request EP Lab    Primary hypertension  -     EKG 12 lead    TIA (transient ischemic attack)        ASSESSMENT and PLAN  1. Paroxysmal atrial fibrillation: I had a discussion with the Pt today regarding rate and rhythm control strategies, rhythm control strategy treatment options including DCCV, antiarrhythmic therapy, catheter ablation and the combination of the above. I discussed at length the advantages and disadvantages of all treatment strategies.   We discussed the option of cardiac ablation in detail, including discussion of some of the more common, however, very low risks of the procedure including access complications and risks of damage to the heart or surrounding structures. We discussed the very low risk of esophageal injury which could result in a serious complication. We discussed practices put in place such as esophageal temperature monitoring and reduced energy and ablation time in areas in close proximity to reduce this risk. A more detailed discussion of possible complications from cardiac ablation will be addressed again during the consent process the day of the procedure. The patient will also meet with the staff anesthesiologist the day of the procedure to discuss some of the same possible risks, as well as other risks, specific to the patient undergoing anesthesia. Pt will undergo a transesophageal echocardiogram immediately prior to catheter insertion to completely exclude the possibility of left atrial appendage thrombus. We will follow up with patient post hospitalization. --SHER followed by afib ablation pending Pt final decision       --hold eliquis the day prior    2. CVA protection: eliquis 5mg    Patient has been instructed and agrees to call our office with any issues or other concerns related to their cardiac condition(s) and/or complaint(s). No follow-up provider specified. Chana Moe MD, MS  Cardiology/Electrophysiology  06/08/22  10:49 AM

## 2022-06-08 NOTE — PROGRESS NOTES
[address], [address], [address]  [phone]    Progress Notes   Felice MRN: [id number]     : 1950     Age: 80 y.o. Subjective/HPI:   This patient is a 80 y.o. male seen and evaluated at the wound clinic for follow-up of traumatic wound of the left lower extremity. Most recently the patient has been receiving dressing changes with Xeroform gauze. On today's visit he is without complaints and reports that the area has finally healed. He denies having any pain or discomfort nor has he noted any redness or drainage. He continues with full activity and is able to ambulate without difficulty. Review of Systems  Pertinent items are noted in HPI. Past Medical History:   Diagnosis Date    Arrhythmia     Hypertension     managed with meds    TIA (transient ischemic attack) 2018    no residual/ [facility] ED      Past Surgical History:   Procedure Laterality Date    ANTERIOR CRUCIATE LIGAMENT REPAIR Left     CATARACT REMOVAL Right     no lens implant - traumatic injury caused cataract    HERNIA REPAIR Left     with mesh     TONSILLECTOMY        No Known Allergies   Social History     Tobacco Use    Smoking status: Never Smoker    Smokeless tobacco: Never Used   Substance Use Topics    Alcohol use: Yes     Alcohol/week: 0.0 - 1.0 standard drinks      Social History     Social History Narrative    Not on file     History reviewed. No pertinent family history.    [unfilled]  Current Outpatient Medications   Medication Sig    amLODIPine (NORVASC) 5 MG tablet Take 5 mg by mouth    apixaban (ELIQUIS) 5 MG TABS tablet Take 5 mg by mouth 2 times daily    aspirin 325 MG tablet Take 325 mg by mouth daily    atorvastatin (LIPITOR) 40 MG tablet Take 40 mg by mouth    lisinopril (PRINIVIL;ZESTRIL) 40 MG tablet Take 40 mg by mouth daily     No current facility-administered medications for this encounter. Objective:     Vitals:    06/08/22 0821 06/08/22 0831   BP:  (!) 131/90   Pulse:  63   Temp:  97.9 °F (36.6 °C)   TempSrc:  Temporal   Weight: 160 lb (72.6 kg)    Height: 5' 7\" (1.702 m)        Physical Exam:  Ambulation: Normal  Gen- the patient is well developed and in no acute distress  HEENT- no focal abnormalities of the scalp or face. Neck- no JVD or retractions  Lungs- normal respiratory effort. Cardiovascular:  Lower limb pulses: 3+. Abd- soft and no masses evident. Ext- warm without cyanosis. There is no lower leg edema. Skin- no jaundice or rashes. Wound medial left ankle has healed. Please see the specific measurements and descriptions of the wound(s) below. There is no evidence of periwound induration or warmth. No purulence or odor. Neuro- alert and oriented x 3. No gross motor deficits are present. Lower limb sensation is intact. Psychological: Affect normal. Mood normal. Memory intact. Assessment:   Traumatic open wound left lower extremity, healed  Patient Active Problem List   Diagnosis    TIA (transient ischemic attack)    Hx of sinus bradycardia    Persistent atrial fibrillation ([facility] [address]    Hypertensive emergency    Traumatic open wound of left lower leg    Primary hypertension     Plan:   Discontinue dressing changes, follow-up at wound center as needed. [unfilled]      Thank you very much for the opportunity to participate in this patient's care. [unfilled]    TIME:  If total time for today's E/M service is used for level of service it is documented below.     This time includes physician non-face-to-face service time visit on the date of service and includes    Preparing to see the patient (eg, review of tests)  Obtaining and/or reviewing separately obtained history  Performing a medically necessary appropriate examination and/or evaluation  Counseling and educating the patient/family/caregiver  Ordering medications, tests, or procedures  Referring and communicating with other health care professionals as needed  Documenting clinical information in the electronic or other health record  Independently interpreting results (not reported separately) and communicating results to the patient/family/caregiver  Care coordination (not reported separately)    E/M time =          minutes.

## 2022-06-08 NOTE — WOUND CARE
Discharge Instructions for  Hudson Carvalho  [address] [place] [address] Maria Del Rosario D[place], [address]  Phone [phone]   Fax [phone]      NAME:  Aung Carcamo. YOB: 1950  MEDICAL RECORD NUMBER:  [id number]  DATE:  6/8/2022    Return Appointment:   Discharge with Dr. Cristal Chauhan MD      Instructions: LLE wound healed  May shower and leave open to air  Protect from sun exposure and any trauma  Discharge from wound center          Should you experience increased redness, swelling, pain, foul odor, size of wound(s), or have a temperature over 101 degrees please contact the [address] [place] [address] Road at [phone] or if after hours contact your primary care physician or go to the hospital emergency department. PLEASE NOTE: IF YOU ARE UNABLE TO OBTAIN WOUND SUPPLIES, CONTINUE TO USE THE SUPPLIES YOU HAVE AVAILABLE UNTIL YOU ARE ABLE TO REACH US. IT IS MOST IMPORTANT TO KEEP THE WOUND COVERED AT ALL TIMES.     Electronically signed Michelle Burris RN on 6/8/2022 at 8:44 AM

## 2022-06-10 ENCOUNTER — TELEPHONE (OUTPATIENT)
Dept: CARDIOLOGY CLINIC | Age: 72
End: 2022-06-10

## 2022-06-10 NOTE — TELEPHONE ENCOUNTER
Real AF study discussed briefly on voicemail at Dr. Patrice Norman' request. Patient was given time to review the consent. I will mail a copy of the ICF to his home for review and discuss further when he comes in for echo on 6/30/2022. I also explained that I would include a business card and he could call me for any questions or concerns at [phone].

## 2022-07-08 DIAGNOSIS — I48.19 PERSISTENT ATRIAL FIBRILLATION (HCC): Primary | ICD-10-CM

## 2022-07-08 DIAGNOSIS — Z01.818 PREOP TESTING: ICD-10-CM

## 2022-07-11 ENCOUNTER — HOSPITAL ENCOUNTER (OUTPATIENT)
Dept: LAB | Age: 72
Discharge: HOME | End: 2022-07-14

## 2022-07-11 ENCOUNTER — ANESTHESIA EVENT (OUTPATIENT)
Dept: CARDIAC CATH/INVASIVE PROCEDURES | Age: 72
End: 2022-07-11
Payer: COMMERCIAL

## 2022-07-11 ENCOUNTER — TELEPHONE (OUTPATIENT)
Dept: CARDIOLOGY CLINIC | Age: 72
End: 2022-07-11

## 2022-07-11 LAB
INR PPP: 1
PROTHROMBIN TIME: 13.4 SEC (ref 12.6–14.5)

## 2022-07-11 PROCEDURE — 85610 PROTHROMBIN TIME: CPT

## 2022-07-11 RX ORDER — ACETAMINOPHEN 500 MG
1000 TABLET ORAL ONCE
Status: CANCELLED | OUTPATIENT
Start: 2022-07-11 | End: 2022-07-11

## 2022-07-11 RX ORDER — MIDAZOLAM HYDROCHLORIDE 2 MG/2ML
2 INJECTION, SOLUTION INTRAMUSCULAR; INTRAVENOUS
Status: CANCELLED | OUTPATIENT
Start: 2022-07-11 | End: 2022-07-11

## 2022-07-11 RX ORDER — SODIUM CHLORIDE 0.9 % (FLUSH) 0.9 %
5-40 SYRINGE (ML) INJECTION PRN
Status: CANCELLED | OUTPATIENT
Start: 2022-07-11

## 2022-07-11 RX ORDER — LIDOCAINE HYDROCHLORIDE 10 MG/ML
1 INJECTION, SOLUTION EPIDURAL; INFILTRATION; INTRACAUDAL; PERINEURAL
Status: CANCELLED | OUTPATIENT
Start: 2022-07-11 | End: 2022-07-11

## 2022-07-11 RX ORDER — SODIUM CHLORIDE 0.9 % (FLUSH) 0.9 %
5-40 SYRINGE (ML) INJECTION EVERY 12 HOURS SCHEDULED
Status: CANCELLED | OUTPATIENT
Start: 2022-07-11

## 2022-07-11 RX ORDER — SODIUM CHLORIDE, SODIUM LACTATE, POTASSIUM CHLORIDE, CALCIUM CHLORIDE 600; 310; 30; 20 MG/100ML; MG/100ML; MG/100ML; MG/100ML
INJECTION, SOLUTION INTRAVENOUS CONTINUOUS
Status: CANCELLED | OUTPATIENT
Start: 2022-07-11

## 2022-07-11 RX ORDER — SODIUM CHLORIDE 9 MG/ML
INJECTION, SOLUTION INTRAVENOUS PRN
Status: CANCELLED | OUTPATIENT
Start: 2022-07-11

## 2022-07-11 NOTE — TELEPHONE ENCOUNTER
Received pre-procedure labs via fax from pt. Signed by Dr. Nazario Latham. To medical records for urgent scanning.

## 2022-07-12 ENCOUNTER — HOSPITAL ENCOUNTER (OUTPATIENT)
Age: 72
Setting detail: OUTPATIENT SURGERY
Discharge: HOME | End: 2022-07-12
Attending: INTERNAL MEDICINE | Admitting: INTERNAL MEDICINE
Payer: COMMERCIAL

## 2022-07-12 ENCOUNTER — ANESTHESIA (OUTPATIENT)
Dept: CARDIAC CATH/INVASIVE PROCEDURES | Age: 72
End: 2022-07-12
Payer: COMMERCIAL

## 2022-07-12 ENCOUNTER — APPOINTMENT (OUTPATIENT)
Dept: CARDIAC CATH/INVASIVE PROCEDURES | Age: 72
End: 2022-07-12
Attending: INTERNAL MEDICINE
Payer: COMMERCIAL

## 2022-07-12 VITALS
SYSTOLIC BLOOD PRESSURE: 125 MMHG | WEIGHT: 160 LBS | DIASTOLIC BLOOD PRESSURE: 74 MMHG | HEIGHT: 67 IN | OXYGEN SATURATION: 95 % | RESPIRATION RATE: 12 BRPM | HEART RATE: 65 BPM | TEMPERATURE: 98 F | BODY MASS INDEX: 25.11 KG/M2

## 2022-07-12 DIAGNOSIS — I48.19 PERSISTENT ATRIAL FIBRILLATION (HCC): ICD-10-CM

## 2022-07-12 LAB
ACT BLD: 353 SECS (ref 70–128)
ANION GAP SERPL CALC-SCNC: 3 MMOL/L (ref 7–16)
BUN SERPL-MCNC: 22 MG/DL (ref 8–23)
CALCIUM SERPL-MCNC: 8.9 MG/DL (ref 8.3–10.4)
CHLORIDE SERPL-SCNC: 109 MMOL/L (ref 98–107)
CO2 SERPL-SCNC: 29 MMOL/L (ref 21–32)
CREAT SERPL-MCNC: 1.1 MG/DL (ref 0.8–1.5)
ECHO BSA: 1.85 M2
ECHO BSA: 1.85 M2
EKG ATRIAL RATE: 107 BPM
EKG DIAGNOSIS: NORMAL
EKG Q-T INTERVAL: 400 MS
EKG QRS DURATION: 92 MS
EKG QTC CALCULATION (BAZETT): 416 MS
EKG R AXIS: 45 DEGREES
EKG T AXIS: 59 DEGREES
EKG VENTRICULAR RATE: 65 BPM
ERYTHROCYTE [DISTWIDTH] IN BLOOD BY AUTOMATED COUNT: 13.2 % (ref 11.9–14.6)
GLUCOSE SERPL-MCNC: 99 MG/DL (ref 65–100)
HCT VFR BLD AUTO: 48.9 % (ref 41.1–50.3)
HGB BLD-MCNC: 16.8 G/DL (ref 13.6–17.2)
INR PPP: 0.9
LV EF: 58 %
LVEF MODALITY: NORMAL
MAGNESIUM SERPL-MCNC: 2.3 MG/DL (ref 1.8–2.4)
MCH RBC QN AUTO: 29.9 PG (ref 26.1–32.9)
MCHC RBC AUTO-ENTMCNC: 34.4 G/DL (ref 31.4–35)
MCV RBC AUTO: 87 FL (ref 79.6–97.8)
NRBC # BLD: 0 K/UL (ref 0–0.2)
PLATELET # BLD AUTO: 193 K/UL (ref 150–450)
PMV BLD AUTO: 10.1 FL (ref 9.4–12.3)
POTASSIUM SERPL-SCNC: 4.1 MMOL/L (ref 3.5–5.1)
PROTHROMBIN TIME: 12.9 SEC (ref 12.6–14.5)
RBC # BLD AUTO: 5.62 M/UL (ref 4.23–5.6)
SODIUM SERPL-SCNC: 141 MMOL/L (ref 138–145)
WBC # BLD AUTO: 5.9 K/UL (ref 4.3–11.1)

## 2022-07-12 PROCEDURE — 93657 TX L/R ATRIAL FIB ADDL: CPT | Performed by: INTERNAL MEDICINE

## 2022-07-12 PROCEDURE — 85610 PROTHROMBIN TIME: CPT

## 2022-07-12 PROCEDURE — 85027 COMPLETE CBC AUTOMATED: CPT

## 2022-07-12 PROCEDURE — C1759 CATH, INTRA ECHOCARDIOGRAPHY: HCPCS | Performed by: INTERNAL MEDICINE

## 2022-07-12 PROCEDURE — 93622 COMP EP EVAL L VENTR PAC&REC: CPT | Performed by: INTERNAL MEDICINE

## 2022-07-12 PROCEDURE — 93312 ECHO TRANSESOPHAGEAL: CPT | Performed by: INTERNAL MEDICINE

## 2022-07-12 PROCEDURE — 7100000000 HC PACU RECOVERY - FIRST 15 MIN: Performed by: INTERNAL MEDICINE

## 2022-07-12 PROCEDURE — 6360000002 HC RX W HCPCS: Performed by: NURSE ANESTHETIST, CERTIFIED REGISTERED

## 2022-07-12 PROCEDURE — C1893 INTRO/SHEATH, FIXED,NON-PEEL: HCPCS | Performed by: INTERNAL MEDICINE

## 2022-07-12 PROCEDURE — 93655 ICAR CATH ABLTJ DSCRT ARRHYT: CPT | Performed by: INTERNAL MEDICINE

## 2022-07-12 PROCEDURE — C1894 INTRO/SHEATH, NON-LASER: HCPCS | Performed by: INTERNAL MEDICINE

## 2022-07-12 PROCEDURE — 80048 BASIC METABOLIC PNL TOTAL CA: CPT

## 2022-07-12 PROCEDURE — 93656 COMPRE EP EVAL ABLTJ ATR FIB: CPT | Performed by: INTERNAL MEDICINE

## 2022-07-12 PROCEDURE — 93662 INTRACARDIAC ECG (ICE): CPT | Performed by: INTERNAL MEDICINE

## 2022-07-12 PROCEDURE — 93320 DOPPLER ECHO COMPLETE: CPT

## 2022-07-12 PROCEDURE — 2500000003 HC RX 250 WO HCPCS: Performed by: NURSE ANESTHETIST, CERTIFIED REGISTERED

## 2022-07-12 PROCEDURE — 93005 ELECTROCARDIOGRAM TRACING: CPT | Performed by: INTERNAL MEDICINE

## 2022-07-12 PROCEDURE — 2580000003 HC RX 258: Performed by: INTERNAL MEDICINE

## 2022-07-12 PROCEDURE — 93325 DOPPLER ECHO COLOR FLOW MAPG: CPT | Performed by: INTERNAL MEDICINE

## 2022-07-12 PROCEDURE — 94760 N-INVAS EAR/PLS OXIMETRY 1: CPT

## 2022-07-12 PROCEDURE — 93613 INTRACARDIAC EPHYS 3D MAPG: CPT | Performed by: INTERNAL MEDICINE

## 2022-07-12 PROCEDURE — 7100000001 HC PACU RECOVERY - ADDTL 15 MIN: Performed by: INTERNAL MEDICINE

## 2022-07-12 PROCEDURE — C1760 CLOSURE DEV, VASC: HCPCS | Performed by: INTERNAL MEDICINE

## 2022-07-12 PROCEDURE — 92960 CARDIOVERSION ELECTRIC EXT: CPT | Performed by: INTERNAL MEDICINE

## 2022-07-12 PROCEDURE — 83735 ASSAY OF MAGNESIUM: CPT

## 2022-07-12 PROCEDURE — 6360000002 HC RX W HCPCS: Performed by: INTERNAL MEDICINE

## 2022-07-12 PROCEDURE — 3700000001 HC ADD 15 MINUTES (ANESTHESIA): Performed by: INTERNAL MEDICINE

## 2022-07-12 PROCEDURE — 93623 PRGRMD STIMJ&PACG IV RX NFS: CPT | Performed by: INTERNAL MEDICINE

## 2022-07-12 PROCEDURE — C1730 CATH, EP, 19 OR FEW ELECT: HCPCS | Performed by: INTERNAL MEDICINE

## 2022-07-12 PROCEDURE — 85347 COAGULATION TIME ACTIVATED: CPT

## 2022-07-12 PROCEDURE — 3700000000 HC ANESTHESIA ATTENDED CARE: Performed by: INTERNAL MEDICINE

## 2022-07-12 PROCEDURE — 93312 ECHO TRANSESOPHAGEAL: CPT

## 2022-07-12 PROCEDURE — 93320 DOPPLER ECHO COMPLETE: CPT | Performed by: INTERNAL MEDICINE

## 2022-07-12 PROCEDURE — 2709999900 HC NON-CHARGEABLE SUPPLY: Performed by: INTERNAL MEDICINE

## 2022-07-12 PROCEDURE — 2720000010 HC SURG SUPPLY STERILE: Performed by: INTERNAL MEDICINE

## 2022-07-12 PROCEDURE — C1732 CATH, EP, DIAG/ABL, 3D/VECT: HCPCS | Performed by: INTERNAL MEDICINE

## 2022-07-12 RX ORDER — PROTAMINE SULFATE 10 MG/ML
INJECTION, SOLUTION INTRAVENOUS PRN
Status: DISCONTINUED | OUTPATIENT
Start: 2022-07-12 | End: 2022-07-12 | Stop reason: SDUPTHER

## 2022-07-12 RX ORDER — HEPARIN SODIUM 10000 [USP'U]/100ML
INJECTION, SOLUTION INTRAVENOUS CONTINUOUS PRN
Status: DISCONTINUED | OUTPATIENT
Start: 2022-07-12 | End: 2022-07-12 | Stop reason: SDUPTHER

## 2022-07-12 RX ORDER — SODIUM CHLORIDE, SODIUM LACTATE, POTASSIUM CHLORIDE, CALCIUM CHLORIDE 600; 310; 30; 20 MG/100ML; MG/100ML; MG/100ML; MG/100ML
INJECTION, SOLUTION INTRAVENOUS CONTINUOUS
Status: DISCONTINUED | OUTPATIENT
Start: 2022-07-12 | End: 2022-07-12 | Stop reason: HOSPADM

## 2022-07-12 RX ORDER — HEPARIN SODIUM 200 [USP'U]/100ML
INJECTION, SOLUTION INTRAVENOUS CONTINUOUS PRN
Status: COMPLETED | OUTPATIENT
Start: 2022-07-12 | End: 2022-07-12

## 2022-07-12 RX ORDER — SUCRALFATE 1 G/1
1 TABLET ORAL 4 TIMES DAILY
Qty: 120 TABLET | Refills: 0 | Status: SHIPPED | OUTPATIENT
Start: 2022-07-12 | End: 2022-08-17 | Stop reason: ALTCHOICE

## 2022-07-12 RX ORDER — PANTOPRAZOLE SODIUM 40 MG/1
40 TABLET, DELAYED RELEASE ORAL
Qty: 60 TABLET | Refills: 0 | Status: SHIPPED | OUTPATIENT
Start: 2022-07-12 | End: 2022-08-17 | Stop reason: ALTCHOICE

## 2022-07-12 RX ORDER — SODIUM CHLORIDE 9 MG/ML
INJECTION, SOLUTION INTRAVENOUS PRN
Status: DISCONTINUED | OUTPATIENT
Start: 2022-07-12 | End: 2022-07-12 | Stop reason: HOSPADM

## 2022-07-12 RX ORDER — COLCHICINE 0.6 MG/1
0.6 TABLET ORAL DAILY
Qty: 30 TABLET | Refills: 0 | Status: SHIPPED | OUTPATIENT
Start: 2022-07-12 | End: 2022-08-17 | Stop reason: ALTCHOICE

## 2022-07-12 RX ORDER — PROCHLORPERAZINE EDISYLATE 5 MG/ML
5 INJECTION INTRAMUSCULAR; INTRAVENOUS
Status: DISCONTINUED | OUTPATIENT
Start: 2022-07-12 | End: 2022-07-12 | Stop reason: HOSPADM

## 2022-07-12 RX ORDER — SODIUM CHLORIDE 0.9 % (FLUSH) 0.9 %
5-40 SYRINGE (ML) INJECTION EVERY 12 HOURS SCHEDULED
Status: DISCONTINUED | OUTPATIENT
Start: 2022-07-12 | End: 2022-07-12 | Stop reason: HOSPADM

## 2022-07-12 RX ORDER — ONDANSETRON 2 MG/ML
4 INJECTION INTRAMUSCULAR; INTRAVENOUS
Status: DISCONTINUED | OUTPATIENT
Start: 2022-07-12 | End: 2022-07-12 | Stop reason: HOSPADM

## 2022-07-12 RX ORDER — LIDOCAINE HYDROCHLORIDE 20 MG/ML
INJECTION, SOLUTION EPIDURAL; INFILTRATION; INTRACAUDAL; PERINEURAL PRN
Status: DISCONTINUED | OUTPATIENT
Start: 2022-07-12 | End: 2022-07-12 | Stop reason: SDUPTHER

## 2022-07-12 RX ORDER — DIPHENHYDRAMINE HYDROCHLORIDE 50 MG/ML
12.5 INJECTION INTRAMUSCULAR; INTRAVENOUS
Status: DISCONTINUED | OUTPATIENT
Start: 2022-07-12 | End: 2022-07-12 | Stop reason: HOSPADM

## 2022-07-12 RX ORDER — ROCURONIUM BROMIDE 10 MG/ML
INJECTION, SOLUTION INTRAVENOUS PRN
Status: DISCONTINUED | OUTPATIENT
Start: 2022-07-12 | End: 2022-07-12 | Stop reason: SDUPTHER

## 2022-07-12 RX ORDER — OXYCODONE HYDROCHLORIDE 5 MG/1
5 TABLET ORAL PRN
Status: DISCONTINUED | OUTPATIENT
Start: 2022-07-12 | End: 2022-07-12 | Stop reason: HOSPADM

## 2022-07-12 RX ORDER — HYDROMORPHONE HYDROCHLORIDE 2 MG/ML
0.5 INJECTION, SOLUTION INTRAMUSCULAR; INTRAVENOUS; SUBCUTANEOUS EVERY 5 MIN PRN
Status: DISCONTINUED | OUTPATIENT
Start: 2022-07-12 | End: 2022-07-12 | Stop reason: HOSPADM

## 2022-07-12 RX ORDER — SODIUM CHLORIDE 0.9 % (FLUSH) 0.9 %
5-40 SYRINGE (ML) INJECTION PRN
Status: DISCONTINUED | OUTPATIENT
Start: 2022-07-12 | End: 2022-07-12 | Stop reason: HOSPADM

## 2022-07-12 RX ORDER — ADENOSINE 3 MG/ML
INJECTION, SOLUTION INTRAVENOUS PRN
Status: DISCONTINUED | OUTPATIENT
Start: 2022-07-12 | End: 2022-07-12 | Stop reason: HOSPADM

## 2022-07-12 RX ORDER — HEPARIN SODIUM 1000 [USP'U]/ML
INJECTION, SOLUTION INTRAVENOUS; SUBCUTANEOUS PRN
Status: DISCONTINUED | OUTPATIENT
Start: 2022-07-12 | End: 2022-07-12 | Stop reason: SDUPTHER

## 2022-07-12 RX ORDER — DEXAMETHASONE SODIUM PHOSPHATE 4 MG/ML
INJECTION, SOLUTION INTRA-ARTICULAR; INTRALESIONAL; INTRAMUSCULAR; INTRAVENOUS; SOFT TISSUE PRN
Status: DISCONTINUED | OUTPATIENT
Start: 2022-07-12 | End: 2022-07-12 | Stop reason: SDUPTHER

## 2022-07-12 RX ORDER — ONDANSETRON 2 MG/ML
INJECTION INTRAMUSCULAR; INTRAVENOUS PRN
Status: DISCONTINUED | OUTPATIENT
Start: 2022-07-12 | End: 2022-07-12 | Stop reason: SDUPTHER

## 2022-07-12 RX ORDER — PROPOFOL 10 MG/ML
INJECTION, EMULSION INTRAVENOUS PRN
Status: DISCONTINUED | OUTPATIENT
Start: 2022-07-12 | End: 2022-07-12 | Stop reason: SDUPTHER

## 2022-07-12 RX ADMIN — ROCURONIUM BROMIDE 40 MG: 50 INJECTION, SOLUTION INTRAVENOUS at 07:20

## 2022-07-12 RX ADMIN — SODIUM CHLORIDE, SODIUM LACTATE, POTASSIUM CHLORIDE, AND CALCIUM CHLORIDE: 600; 310; 30; 20 INJECTION, SOLUTION INTRAVENOUS at 07:14

## 2022-07-12 RX ADMIN — DEXAMETHASONE SODIUM PHOSPHATE 4 MG: 4 INJECTION, SOLUTION INTRAMUSCULAR; INTRAVENOUS at 07:43

## 2022-07-12 RX ADMIN — PROPOFOL 200 MG: 10 INJECTION, EMULSION INTRAVENOUS at 07:20

## 2022-07-12 RX ADMIN — HEPARIN SODIUM 6500 UNITS: 1000 INJECTION, SOLUTION INTRAVENOUS; SUBCUTANEOUS at 07:40

## 2022-07-12 RX ADMIN — LIDOCAINE HYDROCHLORIDE 80 MG: 20 INJECTION, SOLUTION EPIDURAL; INFILTRATION; INTRACAUDAL; PERINEURAL at 07:20

## 2022-07-12 RX ADMIN — HEPARIN SODIUM 40 UNITS/KG/HR: 10000 INJECTION, SOLUTION INTRAVENOUS at 07:50

## 2022-07-12 RX ADMIN — PROTAMINE SULFATE 100 MG: 10 INJECTION, SOLUTION INTRAVENOUS at 08:45

## 2022-07-12 RX ADMIN — ONDANSETRON 4 MG: 2 INJECTION INTRAMUSCULAR; INTRAVENOUS at 07:43

## 2022-07-12 RX ADMIN — ROCURONIUM BROMIDE 10 MG: 50 INJECTION, SOLUTION INTRAVENOUS at 08:20

## 2022-07-12 RX ADMIN — HEPARIN SODIUM 6500 UNITS: 1000 INJECTION, SOLUTION INTRAVENOUS; SUBCUTANEOUS at 07:50

## 2022-07-12 RX ADMIN — SUGAMMADEX 200 MG: 100 INJECTION, SOLUTION INTRAVENOUS at 08:48

## 2022-07-12 ASSESSMENT — LIFESTYLE VARIABLES: SMOKING_STATUS: 0

## 2022-07-12 NOTE — PROGRESS NOTES
Assisted OOB & ambulated to BR & on unit. Tolerated activity without difficulty. Bilateral groins without bleeding or hematoma.

## 2022-07-12 NOTE — ANESTHESIA PRE PROCEDURE
Department of Anesthesiology  Preprocedure Note       Name:  Courtney Felty   Age:  80 y.o.  :  1950                                          MRN:  [id number]         Date:  2022      Surgeon: Cynthia Query):  Alvarado Sykes MD    Procedure: Procedure(s):  Ablation A-fib w complete ep study    Medications prior to admission:   Prior to Admission medications    Medication Sig Start Date End Date Taking?  Authorizing Provider   amLODIPine (NORVASC) 5 MG tablet Take 5 mg by mouth    Ar Automatic Reconciliation   apixaban (ELIQUIS) 5 MG TABS tablet Take 5 mg by mouth 2 times daily 21   Ar Automatic Reconciliation   aspirin 325 MG tablet Take 325 mg by mouth daily    Ar Automatic Reconciliation   atorvastatin (LIPITOR) 40 MG tablet Take 40 mg by mouth 3/3/18   Ar Automatic Reconciliation   lisinopril (PRINIVIL;ZESTRIL) 40 MG tablet Take 40 mg by mouth daily 3/3/18   Ar Automatic Reconciliation       Current medications:    Current Facility-Administered Medications   Medication Dose Route Frequency Provider Last Rate Last Admin    lactated ringers infusion   IntraVENous Continuous Alvarado Sykes MD           Allergies:  No Known Allergies    Problem List:    Patient Active Problem List   Diagnosis Code    TIA (transient ischemic attack) G45.9    Hx of sinus bradycardia Z86.79    Persistent atrial fibrillation (HCC) I48.19    Hypertensive emergency I16.1    Traumatic open wound of left lower leg S81.802A    Primary hypertension I10       Past Medical History:        Diagnosis Date    Arrhythmia     Hypertension     managed with meds    TIA (transient ischemic attack) 2018    no residual/ [facility] ED       Past Surgical History:        Procedure Laterality Date    ANTERIOR CRUCIATE LIGAMENT REPAIR Left     CATARACT REMOVAL Right     no lens implant - traumatic injury caused cataract    HERNIA REPAIR Left     with mesh     TONSILLECTOMY         Social History:    Social History     Tobacco Use    Smoking status: Never Smoker    Smokeless tobacco: Never Used   Substance Use Topics    Alcohol use: Yes     Alcohol/week: 0.0 - 1.0 standard drinks                                Counseling given: Not Answered      Vital Signs (Current):   Vitals:    07/12/22 0628 07/12/22 0633   BP: (!) 146/105    Pulse: 69    Resp: 16    Temp: 97.9 °F (36.6 °C)    TempSrc: Oral    SpO2: 97% 97%   Weight: 160 lb (72.6 kg)    Height: 5' 7\" (1.702 m)                                               BP Readings from Last 3 Encounters:   07/12/22 (!) 146/105   06/30/22 136/80   06/08/22 (!) 131/90       NPO Status: Time of last liquid consumption: 2000                        Time of last solid consumption: 2000                        Date of last liquid consumption: 07/11/22                        Date of last solid food consumption: 07/11/22    BMI:   Wt Readings from Last 3 Encounters:   07/12/22 160 lb (72.6 kg)   06/30/22 160 lb (72.6 kg)   06/08/22 160 lb (72.6 kg)     Body mass index is 25.06 kg/m². CBC:   Lab Results   Component Value Date/Time    WBC 6.2 12/16/2021 11:07 AM    RBC 5.41 12/16/2021 11:07 AM    HGB 16.2 12/16/2021 11:07 AM    HCT 47.7 12/16/2021 11:07 AM    MCV 88.2 12/16/2021 11:07 AM    RDW 13.2 12/16/2021 11:07 AM     12/16/2021 11:07 AM       CMP:   Lab Results   Component Value Date/Time     12/16/2021 11:07 AM    K 3.7 12/16/2021 11:07 AM     12/16/2021 11:07 AM    CO2 32 12/16/2021 11:07 AM    BUN 18 12/16/2021 11:07 AM    CREATININE 1.16 12/16/2021 11:07 AM    GFRAA >60 12/16/2021 11:07 AM    GLUCOSE 98 12/16/2021 11:07 AM    CALCIUM 8.9 12/16/2021 11:07 AM       POC Tests: No results for input(s): POCGLU, POCNA, POCK, POCCL, POCBUN, POCHEMO, POCHCT in the last 72 hours.     Coags:   Lab Results   Component Value Date/Time    PROTIME 13.4 07/11/2022 11:52 AM    INR 1.0 07/11/2022 11:52 AM       HCG (If Applicable): No results found for: PREGTESTUR, PREGSERUM, HCG, HCGQUANT     ABGs: No results found for: PHART, PO2ART, LVM0JJR, KZD9OSP, BEART, I8LGFBTZ     Type & Screen (If Applicable):  No results found for: LABABO, LABRH    Drug/Infectious Status (If Applicable):  No results found for: HIV, HEPCAB    COVID-19 Screening (If Applicable): No results found for: COVID19        Anesthesia Evaluation  Patient summary reviewed and Nursing notes reviewed no history of anesthetic complications:   Airway: Mallampati: II     Neck ROM: full  Mouth opening: > = 3 FB   Dental: normal exam         Pulmonary:Negative Pulmonary ROS and normal exam        (-) not a current smoker                           Cardiovascular:  Exercise tolerance: good (>4 METS),   (+) hypertension:, dysrhythmias (Stopped Eliquis Sunday): atrial fibrillation, hyperlipidemia        Rhythm: irregular  Rate: normal                 ROS comment: Echo from June 2022:       Tricuspid Valve: Mild regurgitation. The estimated RVSP is 21 mmHg.   Left Ventricle: Normal left ventricular systolic function. EF by 2D Simpsons Biplane is 56%. Left ventricle size is normal. Mildly increased wall thickness. Septal thickening. Normal wall motion. Indeterminate diastolic function due to arrhythmia.   Right Ventricle: Right ventricle size is normal. Normal systolic function.   Mitral Valve: Mildly thickened leaflet, at the anterior and posterior leaflets. Mild regurgitation.   Left Atrium: Left atrial volume index is mildly increased (35-41 mL/m2). LA Vol Index is  36 ml/m2. Neuro/Psych:   (+) TIA (About 4 years ago related to hyperyensive crisis),             GI/Hepatic/Renal: Neg GI/Hepatic/Renal ROS            Endo/Other: Negative Endo/Other ROS                    Abdominal:             Vascular: negative vascular ROS. Other Findings:           Anesthesia Plan      general     ASA 3             Anesthetic plan and risks discussed with patient and spouse.     Use of blood products discussed with patient whom.                      Nicole Eric MD   7/12/2022

## 2022-07-12 NOTE — PROGRESS NOTES
TRANSFER - IN REPORT:    Verbal report received from STACEY Garcia on [facility] being received from PACU for routine post-op      Report consisted of patients Situation, Background, Assessment and Recommendations(SBAR). Information from the following report(s) Procedure Summary was reviewed with the receiving nurse. Opportunity for questions and clarification was provided. Assessment completed upon patients arrival to unit and care assumed.

## 2022-07-12 NOTE — Clinical Note
Accessed site: right femoral vein. Using ultrasound guidance. Accessed successfully. LMOM to schedule CPE after January 11.

## 2022-07-12 NOTE — PROGRESS NOTES
Discharge instructions given per orders, voiced good understanding of Bilateral groin cath site care, medications & follow up care.  Denies any questions

## 2022-07-12 NOTE — PROGRESS NOTES
Bilateral groin sites CDI, pt educated to keep legs straight. HOB flat. VS Q15 min cycling. Call light provided. Family at bedside, no current needs.

## 2022-07-12 NOTE — Clinical Note
Transseptal Cath Performed under hemodynamic and ICE, utilizing a standard needle, Hampton 71cm via a guiding sheath. Needle inserted.

## 2022-07-12 NOTE — PROGRESS NOTES
Patient received to [address] [place] [address] room # 9. Ambulatory from [facility]. Patient scheduled for SHER/A fib ablation today with Dr Caity Paul. Procedure reviewed & questions answered, voiced good understanding consent obtained & placed on chart. All medications and medical history reviewed. Will prep patient per orders. Patient & family updated on plan of care. The patient has a fraility score of 2-WELL, based on ability to perform ADLs. Patient's last dose of Eliquis was 7/10/22 PM dose.

## 2022-07-12 NOTE — ANESTHESIA PROCEDURE NOTES
Airway  Urgency: elective    Airway not difficult    General Information and Staff    Patient location during procedure: OR  Other anesthesia staff: Saeed Mckeon RN  Performed: resident/CRNA and other anesthesia staff     Indications and Patient Condition  Indications for airway management: anesthesia  Spontaneous Ventilation: absent  Sedation level: deep  Preoxygenated: yes  Patient position: sniffing  MILS not maintained throughout  Mask difficulty assessment: vent by bag mask    Final Airway Details  Final airway type: endotracheal airway      Successful airway: ETT  Cuffed: yes   Successful intubation technique: direct laryngoscopy  Facilitating devices/methods: intubating stylet  Endotracheal tube insertion site: oral  Blade: Nikunj  Blade size: #4  ETT size (mm): 8.0  Cormack-Lehane Classification: grade IIa - partial view of glottis  Placement verified by: chest auscultation and capnometry   Measured from: teeth  ETT to teeth (cm): 23  Number of attempts at approach: 1  Ventilation between attempts: bag mask  Number of other approaches attempted: 0    no

## 2022-07-12 NOTE — PROCEDURES
Pre-Electrophysiology Diagnosis  1. Persistent Atrial fibrillation     Procedure Performed  1. EPS afib ablation/pulmonary vein isolation  2. Left atrial pacing recording from the coronary sinus. 3. 3-D Electroanatomical mapping  4. Intracardiac echo  5. Ablation of second arrhythmia  6. LV pacing and recording  7. Transesophageal echo  8. Drug infusion  9. Cardioversion  10. Ablation of additional linear lines x 2    Anesthesia: General     Estimated Blood Loss: Less than 10 mL     Procedure in Detail:  The patient was brought to the electrophysiology lab in the fasting state. The patient was intubated by anesthesiology and an esophageal temperature probe inserted and advanced to a location directly posterior to the LA at the level of the pulmonary veins. The esophageal temperature probe was repositioned throughout the case to a location as close the the ablation catheter as possible. If the esophageal temperature increased 0.5 degrees Celsius ablation was stopped until the temperature returned to baseline. A tranesophageal echocardiogram was performed directly prior to the procedure and was negative for a GIULIA thrombus (see full report in chart). A Ref-Star CARTO patch was placed, the patient was then prepped and draped in sterile fashion. Venous access was obtained under ultrasound guidance x4 using modified Seldinger technique, with placement of one 8Fr short sidearm sheath and two 8.5 Fr SL-O 63cm long braided sheaths in the right femoral vein and placement of a 10Fr sheath into the left femoral vein. The patient presented to the EP lab in atrial fibrillation and underwent DCCV with pads across the anterior and posterior chest.  An intra-cardiac echo probe was prepped, and then inserted into an 10 Fr short sheath and used to localize the fossa ovalis and create LA and pulmonary vein anatomy utilizing [facility].   A [employer] Pentaray catheter was then inserted into an 8.5 SLO Fr sheath and used to create an electroanatomical map of the right atrium, including attempts at His localization and the os of the CS. Then a Smart Touch porous irrigated BW 3.5mm ablation catheter was used to map out the body of the CS. A decapolar [employer] Lopez CS catheter was inserted via an 8Fr sheath and positioned in the mid coronary sinus. Next, a trans-septal needle was inserted into the SLO and was used to perform a trans-septal puncture with assistance from ICE (intra-cardiac echo) as well as the [address] [place] [address],[address] map and the right atrial impedence map. The SLO sheath was advanced into the LA. Total weight based heparin bolus was administered just after transeptal access, and systemic blood pressure monitored invasively. The patient was then placed on our heparin weight based nomogram for targeted ACT of 300-350 during the ablation procedure. A second trans-septal access was obtained with the ablation catheter. The Pentaray catheter was then inserted into the LA via the SL-O sheath and was used to create a detail electroanatomical voltage map of the left atrium including the pulmonary veins to identify the pulmonary vein sleeves and further guide additional ablation as pictured below. The Pentaray was used to map pulmonary vein potentials and target ablation. An 8 Fr, 3.5mm tip saline irrigated bidirectional D/F curve Thermo-cool SmartTouch catheter was used for RF ablation and ablation was performed at 35-50W with reduction in power as needed if ablation was performed in close proximity to the esophagus. Antral pulmonary vein isolation was then performed for all 4 pulmonary veins. Entrance and exit block was demonstrated by left atrial pacing and within the pulmonary veins. Lack of any conducted atrial EGMs into the pulmonary veins was documented.   Ablation of an additional linear line was performed across the LA roof and ablation of another additional linear line was performed across the LA floor to obtain LA posterior wall isolation. Adenosine was injected (12 mg) to demonstrate the lack of early reconnection with the Pentaray in the PVs and the posterior wall. There was no early reconnection with adenosine. The ablation catheter was inserted into the LV, documented LV electrograms and was used to pace the LV for the EP study and for rescue pacing during adenosine infusion. Post PVI, the Pentaray was used to perform a second LA voltage map post ablation to demonstrate pulmonary vein isolation and post ablation voltage as pictured below. Baseline LA Voltage Map      Post Ablation LA Voltage Map      Cavotricuspid Isthmus ablation (right atrial flutter)  Linear ablation across the cavo-tricuspid isthmus was performed starting with 1:2 A:V EGMs along the isthmus at 6pm Khmer. The local electrogram activation sequence, differential pacing maneuvers and electrogram timing was used to demonstrate bidirectional block along the cavotricuspid isthmus. Post-Ablation trans-isthmus time: 170 msec    Next, baseline recordings and a diagnostic EP study was performed. The coronary sinus multipolar catheter was used to pace the left atrium during the EP study. The LA CS electrograms were documented and interpreted during the procedure. Ventricular pacing revealed retrograde AV conduction which was concentric and decremental.    At the completion of the ablation and EPS, all long sheaths were exchanged for short 8 Fr sheaths and 100 units of Protamine was delivered to reverse the effect of heparin. Four VASCADE MVP devices were used to close the venotomy sites. The MVP tools were advanced into the 8 Fr and 10 Fr sheaths, respectively; the sheaths were then removed. The collagen was then deployed and a 30 second dwell time was performed to allow for expansion of the collagen. The delivery tools were then removed with adequate hemostasis.      The patient tolerated the procedure well with no acute complications Gastroesophageal reflux disease in pediatric patient

## 2022-07-12 NOTE — ANESTHESIA POSTPROCEDURE EVALUATION
Department of Anesthesiology  Postprocedure Note    Patient: Wen Tran  MRN: [id number]  YOB: 1950  Date of evaluation: 7/12/2022      Procedure Summary     Date: 07/12/22 Room / Location: [facility] EP/CATH 4 ALL EVENTS / [facility] CARDIAC CATH LAB    Anesthesia Start: 0714 Anesthesia Stop: 1038    Procedure: Ablation A-fib w complete ep study (N/A ) Diagnosis:       Persistent atrial fibrillation (HCC)      (Persistent atrial fibrillation ([address] [I48.19])    Providers: Jose Rafael Hair MD Responsible Provider: Danna Sanders MD    Anesthesia Type: general ASA Status: 3          Anesthesia Type: No value filed.     Tavon Phase I: Tavon Score: 10    Tavon Phase II:        Anesthesia Post Evaluation    Patient location during evaluation: PACU  Patient participation: complete - patient participated  Level of consciousness: awake and alert  Airway patency: patent  Nausea & Vomiting: no nausea and no vomiting  Complications: no  Cardiovascular status: hemodynamically stable  Respiratory status: acceptable, nonlabored ventilation and spontaneous ventilation  Hydration status: euvolemic  Comments: /87   Pulse 79   Temp 98 °F (36.7 °C)   Resp 12   Ht 5' 7\" (1.702 m)   Wt 160 lb (72.6 kg)   SpO2 94%   BMI 25.06 kg/m²     Multimodal analgesia pain management approach

## 2022-07-12 NOTE — Clinical Note
Closed using: manual pressure and Vascade. Site secured by transparent dressing. Manual pressure held for: 5 minutes.

## 2022-07-12 NOTE — PERIOP NOTE
TRANSFER - OUT REPORT:    Verbal report given to Izabella Wilson RN on [facility]  being transferred to Cath lab for routine post-op       Report consisted of patients Situation, Background, Assessment and   Recommendations(SBAR). Information from the following report(s) Surgery Report, Intake/Output, MAR and Recent Results was reviewed with the receiving nurse. Lines:   Peripheral IV 07/12/22 Right;Dorsal Hand (Active)   Site Assessment Clean, dry & intact 07/12/22 0903   Line Status Infusing 07/12/22 0903   Line Care Connections checked and tightened 07/12/22 0903   Phlebitis Assessment No symptoms 07/12/22 0903   Infiltration Assessment 0 07/12/22 0903   Dressing Status Clean, dry & intact 07/12/22 0903   Dressing Type Transparent 07/12/22 0903       Peripheral IV 07/12/22 Left Antecubital (Active)   Site Assessment Clean, dry & intact 07/12/22 0903   Line Status Capped 07/12/22 0903   Line Care Connections checked and tightened 07/12/22 0903   Phlebitis Assessment No symptoms 07/12/22 0903   Infiltration Assessment 0 07/12/22 0903   Dressing Status Clean, dry & intact 07/12/22 0903   Dressing Type Transparent 07/12/22 7459        Opportunity for questions and clarification was provided.       Patient transported with:   Registered Nurse    VTE prophylaxis orders have not been written for [facility].

## 2022-07-12 NOTE — Clinical Note
Anesthesia present. Anesthesia will monitor and maintain: airway, IV access, sedation, medications, and vital signs. Refer to Anesthesia report for further documentation. Nursery folder reviewed. Infant safety measures explained. Instructed parents that infant is to be with someone that has a matching ID band, or infant is to be in nursery. [employer] tag system reviewed. Informed parent that maternal child is the only floor with yellow name badges and infant is only to leave room with someone from [facility] floor. Explained that infant is to be in crib in the hallway, not held in arms. Safe sleep discussed. 24 hour screenings discussed and brochures given. Verbalized understanding.      Included in folder:  A new beginning book; personal guide to postpartum and  care  Hepatitis B information brochure  Recommended immunization schedule  Feeding chart  Birth certificate worksheet  Special dinner menu  Sources for community help; health department list  Falls and safety contract  Safe sleep flyer  Circumcision consent (if male infant desiring circumcision)  Hearing screen consent

## 2022-07-12 NOTE — H&P
[facility] Cardiology/Electrophyiology Consult                Date of  Admission: 7/12/2022  5:25 AM     CC/Reason for admission: afib ablation     Sim Charles is a 80 y.o. male admitted for Persistent atrial fibrillation ([facility] [address] [I48.19]. 80 y.o. male with a past medical and cardiac history significant for HTN, TIA and newly recognized atrial fibrillation presenting for scheduled atrial fibrillation ablation. Pt is very active, cycles, and has noted worsening SOB, MCGUIRE over the past few weeks with associated fatigue and has noted increased HR and decreased stamina. He has SHER guided DCCV last year 12/21 and has had recent recurrence of AF. Cardiac PMH: (Old records have been reviewed and summarized below)    Patient Active Problem List   Diagnosis    TIA (transient ischemic attack)    Hx of sinus bradycardia    Persistent atrial fibrillation (HCC)    Hypertensive emergency    Traumatic open wound of left lower leg    Primary hypertension       Past Medical History:   Diagnosis Date    Arrhythmia     Hypertension     managed with meds    TIA (transient ischemic attack) 03/2018    no residual/ [facility]T ED      Past Surgical History:   Procedure Laterality Date    ANTERIOR CRUCIATE LIGAMENT REPAIR Left 1986    CATARACT REMOVAL Right 1990s    no lens implant - traumatic injury caused cataract    HERNIA REPAIR Left 1980s    with mesh     TONSILLECTOMY  1956     No Known Allergies   History reviewed. No pertinent family history.      Current Facility-Administered Medications   Medication Dose Route Frequency    lactated ringers infusion   IntraVENous Continuous       Review of Symptoms:  A comprehensive ROS was performed with the pertinent positives and negatives mentioned in the HPI, all other systems reviewed and are negative       Physical Exam  Vitals:    07/12/22 0628 07/12/22 0633   BP: (!) 146/105    Pulse: 69    Resp: 16    Temp: 97.9 °F (36.6 °C)    TempSrc: Oral    SpO2: 97% 97%   Weight: 160 lb (72.6 kg)    Height: 5' 7\" (1.702 m)        Physical Exam:  Gen: well appearing, well developed, NAD  Eyes: Pupils equal, EOMI  CV: RRR, no M/R/G, normal JVD, normal distal pulses, no EZEQUIEL  Pulm: CTAB, no accessory muscle uses, no wheezes, crackles  GI: soft, NT, ND  Neuro: Alert and oriented    Cardiographics    Telemetry:   ECG (Indpendently visualized and interpreted):  Echocardiogram:     Labs:   Recent Labs     07/11/22  1152   INR 1.0        Assessment:      Principal Problem:    Persistent atrial fibrillation ([address]  Resolved Problems:    * No resolved hospital problems. *         Plan:   1. Paroxysmal atrial fibrillation: I had a discussion with the Pt regarding rate and rhythm control strategies, rhythm control strategy treatment options including DCCV, antiarrhythmic therapy, catheter ablation and the combination of the above. I discussed at length the advantages and disadvantages of all treatment strategies. We discussed the option of cardiac ablation in detail, including discussion of some of the more common, however, very low risks of the procedure including access complications and risks of damage to the heart or surrounding structures. We discussed the very low risk of esophageal injury which could result in a serious complication. We discussed practices put in place such as esophageal temperature monitoring and reduced energy and ablation time in areas in close proximity to reduce this risk. Pt will undergo a transesophageal echocardiogram immediately prior to catheter insertion to completely exclude the possibility of left atrial appendage thrombus. --SHER followed by afib ablation      2. CVA protection: eliquis 5mg    Hiram Grant MD, MS  Cardiology/Electrophysiology

## 2022-08-17 ENCOUNTER — OFFICE VISIT (OUTPATIENT)
Dept: CARDIOLOGY CLINIC | Age: 72
End: 2022-08-17
Payer: COMMERCIAL

## 2022-08-17 VITALS
DIASTOLIC BLOOD PRESSURE: 80 MMHG | BODY MASS INDEX: 25.91 KG/M2 | WEIGHT: 165.1 LBS | HEART RATE: 58 BPM | HEIGHT: 67 IN | SYSTOLIC BLOOD PRESSURE: 144 MMHG

## 2022-08-17 DIAGNOSIS — I48.19 PERSISTENT ATRIAL FIBRILLATION (HCC): Primary | ICD-10-CM

## 2022-08-17 PROCEDURE — 93000 ELECTROCARDIOGRAM COMPLETE: CPT | Performed by: INTERNAL MEDICINE

## 2022-08-17 PROCEDURE — 99213 OFFICE O/P EST LOW 20 MIN: CPT | Performed by: INTERNAL MEDICINE

## 2022-08-17 PROCEDURE — 1123F ACP DISCUSS/DSCN MKR DOCD: CPT | Performed by: INTERNAL MEDICINE

## 2022-08-17 NOTE — PROGRESS NOTES
[address] [place]  [address], [address], [address]  [facility], [address] [place] [address]  PHONE: [address]  1950    Chief Complant:    Chief Complaint   Patient presents with    Follow-Up from Hospital     1 month post afib abl    Atrial Fibrillation      Consultation is requested by [unfilled] for evaluation of Follow-Up from Hospital (1 month post afib abl) and Atrial Fibrillation    Reason for Consultation: persistent atrial fibrillation     History:  Jennifer Diallo is a very pleasant 80 y.o. male with a past medical and cardiac history significant for HTN, TIA and newly recognized atrial fibrillation presenting for EP consultation for atrial fibrillation. Pt is very active, cycles, and has noted worsening SOB, MCGUIRE over the past few weeks with associated fatigue and has noted increased HR and decreased stamina. He has SHER guided DCCV last year 12/21 and has had recent recurrence of AF. He is now s/p afib ablation, doing well. Cardiac PMH: (Old records have been reviewed and summarized below)  TTE (12/21): EF 60-65%, mod TR    Reviewed office note Dr. Gautam Bailey 4/6/22    Past Medical History, Past Surgical History, Family history, Social History, and Medications were all reviewed with the patient today and updated as necessary. Current Outpatient Medications   Medication Sig Dispense Refill    amLODIPine (NORVASC) 5 MG tablet Take 5 mg by mouth      apixaban (ELIQUIS) 5 MG TABS tablet Take 5 mg by mouth 2 times daily      atorvastatin (LIPITOR) 40 MG tablet Take 40 mg by mouth      lisinopril (PRINIVIL;ZESTRIL) 40 MG tablet Take 40 mg by mouth daily       No current facility-administered medications for this visit.      No Known Allergies  [unfilled]    Past Medical History:   Diagnosis Date    Arrhythmia     Hypertension     managed with meds    TIA (transient ischemic attack) 03/2018    no residual/ [facility]T ED     Past Surgical History:   Procedure Laterality Date    ANTERIOR CRUCIATE LIGAMENT REPAIR Left 1986    CATARACT REMOVAL Right 1990s    no lens implant - traumatic injury caused cataract    EP DEVICE PROCEDURE N/A 7/12/2022    Ablation A-fib w complete ep study performed by Albin Calderón MD at [facility] LAB    HERNIA REPAIR Left 1980s    with mesh     TONSILLECTOMY  1956     No family history on file. Social History     Tobacco Use    Smoking status: Never    Smokeless tobacco: Never   Substance Use Topics    Alcohol use: Yes     Alcohol/week: 0.0 - 1.0 standard drinks     PHYSICAL EXAM:   BP (!) 144/80   Pulse 58   Ht 5' 7\" (1.702 m)   Wt 165 lb 1.6 oz (74.9 kg)   BMI 25.86 kg/m²      Wt Readings from Last 3 Encounters:   08/17/22 165 lb 1.6 oz (74.9 kg)   07/12/22 160 lb (72.6 kg)   06/30/22 160 lb (72.6 kg)     BP Readings from Last 3 Encounters:   08/17/22 (!) 144/80   07/12/22 125/74   06/30/22 136/80       Gen: well appearing, well developed, NAD  Eyes: Pupils equal, EOMI  CV: RRR, no M/R/G, normal JVD, normal distal pulses, no EZEQUIEL  Pulm: CTAB, no accessory muscle uses, no wheezes, crackles  GI: soft, NT, ND  Neuro: Alert and oriented    Medical problems and test results were reviewed with the patient today. No results found for any visits on 08/17/22. EKG:  (EKG has been independently visualized by me with interpretation below)  Sinus  Bradycardia, rate 58, normal axis   WITHIN NORMAL LIMITS    Jessica Matias was seen today for follow-up from hospital and atrial fibrillation. Diagnoses and all orders for this visit:    Persistent atrial fibrillation ([address]  -     EKG 12 lead      ASSESSMENT and PLAN  1. Paroxysmal atrial fibrillation: doing well s/p ablation, maintaining NSR off antiarrhythmics    2. CVA protection: eliquis 5mg    Patient has been instructed and agrees to call our office with any issues or other concerns related to their cardiac condition(s) and/or complaint(s). No follow-up provider specified. Layla Bliss MD, MS  Cardiology/Electrophysiology  08/17/22  3:59 PM

## 2022-10-05 ENCOUNTER — OFFICE VISIT (OUTPATIENT)
Dept: CARDIOLOGY CLINIC | Age: 72
End: 2022-10-05
Payer: COMMERCIAL

## 2022-10-05 VITALS
BODY MASS INDEX: 25.6 KG/M2 | HEIGHT: 67 IN | SYSTOLIC BLOOD PRESSURE: 146 MMHG | HEART RATE: 57 BPM | WEIGHT: 163.1 LBS | DIASTOLIC BLOOD PRESSURE: 82 MMHG

## 2022-10-05 DIAGNOSIS — I10 PRIMARY HYPERTENSION: ICD-10-CM

## 2022-10-05 DIAGNOSIS — I48.19 PERSISTENT ATRIAL FIBRILLATION (HCC): Primary | ICD-10-CM

## 2022-10-05 PROCEDURE — 93000 ELECTROCARDIOGRAM COMPLETE: CPT | Performed by: INTERNAL MEDICINE

## 2022-10-05 PROCEDURE — 1123F ACP DISCUSS/DSCN MKR DOCD: CPT | Performed by: INTERNAL MEDICINE

## 2022-10-05 PROCEDURE — 99213 OFFICE O/P EST LOW 20 MIN: CPT | Performed by: INTERNAL MEDICINE

## 2022-10-05 RX ORDER — ASPIRIN 81 MG/1
81 TABLET ORAL DAILY
COMMUNITY

## 2022-10-05 NOTE — PROGRESS NOTES
[address] [place]  [address], [address], [address]  [facility], [address] [place] [address]  PHONE: [address]  1950    Chief Complant:    Chief Complaint   Patient presents with    Follow-Up from Hospital    Atrial Fibrillation     10-12 weeks post afib abl      Consultation is requested by [unfilled] for evaluation of Follow-Up from Hospital and Atrial Fibrillation (10-12 weeks post afib abl)    Reason for Consultation: persistent atrial fibrillation     History:  Whitley Thomas is a very pleasant 80 y.o. male with a past medical and cardiac history significant for HTN, TIA and newly recognized atrial fibrillation presenting for EP consultation for atrial fibrillation. Pt is very active, cycles, and has noted worsening SOB, MCGUIRE over the past few weeks with associated fatigue and has noted increased HR and decreased stamina. He has SHER guided DCCV last year 12/21 and has had recent recurrence of AF. He is now s/p afib ablation, cont to do well, no recurrent AF. Cardiac PMH: (Old records have been reviewed and summarized below)  TTE (12/21): EF 60-65%, mod TR    Reviewed office note Dr. Jd Umanzor 4/6/22    Past Medical History, Past Surgical History, Family history, Social History, and Medications were all reviewed with the patient today and updated as necessary. Current Outpatient Medications   Medication Sig Dispense Refill    aspirin 81 MG EC tablet Take 81 mg by mouth daily      amLODIPine (NORVASC) 5 MG tablet Take 5 mg by mouth      apixaban (ELIQUIS) 5 MG TABS tablet Take 5 mg by mouth 2 times daily      atorvastatin (LIPITOR) 40 MG tablet Take 40 mg by mouth      lisinopril (PRINIVIL;ZESTRIL) 40 MG tablet Take 40 mg by mouth daily       No current facility-administered medications for this visit.      No Known Allergies  [unfilled]    Past Medical History:   Diagnosis Date    Arrhythmia     Hypertension     managed with meds    TIA (transient ischemic attack) 03/2018    no residual/ [facility]T ED     Past Surgical History:   Procedure Laterality Date    ANTERIOR CRUCIATE LIGAMENT REPAIR Left 1986    CATARACT REMOVAL Right 1990s    no lens implant - traumatic injury caused cataract    EP DEVICE PROCEDURE N/A 7/12/2022    Ablation A-fib w complete ep study performed by Author MD Marycruz at [address] [place] [address] LAB    HERNIA REPAIR Left 1980s    with mesh     TONSILLECTOMY  1956     No family history on file. Social History     Tobacco Use    Smoking status: Never    Smokeless tobacco: Never   Substance Use Topics    Alcohol use: Yes     Alcohol/week: 0.0 - 1.0 standard drinks     PHYSICAL EXAM:   BP (!) 146/82   Pulse 57   Ht 5' 7\" (1.702 m)   Wt 163 lb 1.6 oz (74 kg)   BMI 25.55 kg/m²      Wt Readings from Last 3 Encounters:   10/05/22 163 lb 1.6 oz (74 kg)   08/17/22 165 lb 1.6 oz (74.9 kg)   07/12/22 160 lb (72.6 kg)     BP Readings from Last 3 Encounters:   10/05/22 (!) 146/82   08/17/22 (!) 144/80   07/12/22 125/74       Gen: well appearing, well developed, NAD  Eyes: Pupils equal, EOMI  CV: RRR, no M/R/G, normal JVD, normal distal pulses, no EZEQUIEL  Pulm: CTAB, no accessory muscle uses, no wheezes, crackles  GI: soft, NT, ND  Neuro: Alert and oriented    Medical problems and test results were reviewed with the patient today. No results found for any visits on 10/05/22. EKG:  (EKG has been independently visualized by me with interpretation below)  Sinus  Bradycardia  - occasional ectopic ventricular beat     -Poor R-wave progression -nonspecific -consider old anterior infarct. Bonnie Camacho was seen today for follow-up from hospital and atrial fibrillation. Diagnoses and all orders for this visit:    Persistent atrial fibrillation ([address]  -     EKG 12 lead    Primary hypertension      ASSESSMENT and PLAN  1. Paroxysmal atrial fibrillation: doing well s/p ablation, maintaining NSR off antiarrhythmics    2.  CVA protection: eliquis 5mg    Patient has been instructed and agrees to call our office with any issues or other concerns related to their cardiac condition(s) and/or complaint(s). No follow-up provider specified. Codey Flores MD, MS  Cardiology/Electrophysiology  10/05/22  1:35 PM

## 2023-01-30 ENCOUNTER — TELEPHONE (OUTPATIENT)
Dept: CARDIOLOGY CLINIC | Age: 73
End: 2023-01-30

## 2023-01-30 NOTE — TELEPHONE ENCOUNTER
----- Message from Robel Price MD sent at 1/30/2023  2:32 PM EST -----  No atrial fibrillation, frequent nonsustained brief SVT (longest 12 beats), no major arrhythmias

## 2023-10-30 NOTE — WOUND CARE
[address]  [address], [address]  Phone: [phone]  Fax: [phone]    Patient: Hannah Goncalves MRN: [id number]  SSN: [id number]    YOB: 1950  Age: 80 y.o. Sex: male       Return Appointment: 1 week with Dr. Emanuel Hassan    Instructions: Left Medial Ankle:  Cleanse wound with normal saline. DO NOT GET WET IN SHOWER. Apply Dakin's solution 0.25%-apply to wound on gauze or packing strip. Cover with ABD/ Optilock pad/gauze 4x4. Cover with rolled gauze and tubigrip stocking. Change daily.      *Eat high protein diet and drink Robbie packet once a day to aid in wound healing. Culture obtained today.  and start taking Augmentin today. Should you experience increased redness, swelling, pain, foul odor, size of wound(s), or have a temperature over 101 degrees please contact the [address] [place] [address] Road at [phone] or if after hours contact your primary care physician or go to the hospital emergency department.     Signed By: Cindy Underwood RN     March 30, 2022 Colchicine Pregnancy And Lactation Text: This medication is Pregnancy Category C and isn't considered safe during pregnancy. It is excreted in breast milk.

## 2024-01-22 NOTE — PROGRESS NOTES
07/12/2022 06:27 AM    CALCIUM 8.9 07/12/2022 06:27 AM         No results for input(s): \"WBC\", \"HGB\", \"HCT\", \"MCV\", \"PLT\" in the last 720 hours.     No results found for: \"LABA1C\"  No results found for: \"EAG\"     No results found for: \"BNP\"     No results found for: \"TSHFT4\", \"TSH\"     Results for orders placed or performed in visit on 01/26/24   EKG 12 Lead    Impression    Normal sinus rhythm 61 bpm  Borderline first-degree AV block  Normal axis and intervals otherwise  Normal ST and T waves        ASSESSMENT and PLAN    Diagnoses and all orders for this visit:      1. Paroxysmal atrial fibrillation (HCC)- s/p successful A-fib ablation with Dr. Grant July 2022.  Continue Eliquis without interruption.  Continue healthy diet exercise regimen as tolerated.       2. 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  No symptoms of chronotropic incompetence.  Monitor clinically for now.  Avoid any rate slowing meds.       3. TIA (transient ischemic attack)-continue Eliquis and blood pressure meds with no recurrence after initiation of therapy       4. Hypertension- continue current doses of Norvasc and lisinopril and minimize sodium. Check a daily BP and HR after 5min rest and relaxation for 2 weeks.  Mail results of BP and HR log to the office for my review and for appropriate medication adjustments as needed. Continue with low sodium diet as tolerated.      5. MCGUIRE (dyspnea on exertion)-likely due to loss of atrial kick with atrial fib.  Completely resolved now.  Follow clinically.  Call immediately with any recurrence, assess for recurrence of A-fib at that point.       6. Chronic fatigue-likely due to loss of atrial kick with A. fib.  Resolved now.  Follow clinically.  As above.            Return in about 6 months (around 7/26/2024).         EKATERINA CALDERON MD  1/26/2024  4:02 PM

## 2024-01-26 ENCOUNTER — OFFICE VISIT (OUTPATIENT)
Age: 74
End: 2024-01-26
Payer: COMMERCIAL

## 2024-01-26 VITALS
SYSTOLIC BLOOD PRESSURE: 143 MMHG | DIASTOLIC BLOOD PRESSURE: 75 MMHG | HEART RATE: 61 BPM | BODY MASS INDEX: 25.05 KG/M2 | WEIGHT: 159.6 LBS | HEIGHT: 67 IN

## 2024-01-26 DIAGNOSIS — I48.19 PERSISTENT ATRIAL FIBRILLATION (HCC): Primary | ICD-10-CM

## 2024-01-26 DIAGNOSIS — Z86.79 HX OF SINUS BRADYCARDIA: ICD-10-CM

## 2024-01-26 DIAGNOSIS — I10 PRIMARY HYPERTENSION: ICD-10-CM

## 2024-01-26 PROCEDURE — 3078F DIAST BP <80 MM HG: CPT | Performed by: INTERNAL MEDICINE

## 2024-01-26 PROCEDURE — 3077F SYST BP >= 140 MM HG: CPT | Performed by: INTERNAL MEDICINE

## 2024-01-26 PROCEDURE — 99214 OFFICE O/P EST MOD 30 MIN: CPT | Performed by: INTERNAL MEDICINE

## 2024-01-26 PROCEDURE — 93000 ELECTROCARDIOGRAM COMPLETE: CPT | Performed by: INTERNAL MEDICINE

## 2024-01-26 PROCEDURE — 1123F ACP DISCUSS/DSCN MKR DOCD: CPT | Performed by: INTERNAL MEDICINE

## 2024-07-31 ENCOUNTER — OFFICE VISIT (OUTPATIENT)
Age: 74
End: 2024-07-31
Payer: COMMERCIAL

## 2024-07-31 VITALS
SYSTOLIC BLOOD PRESSURE: 144 MMHG | HEART RATE: 52 BPM | DIASTOLIC BLOOD PRESSURE: 90 MMHG | HEIGHT: 67 IN | BODY MASS INDEX: 24.8 KG/M2 | WEIGHT: 158 LBS

## 2024-07-31 DIAGNOSIS — I10 PRIMARY HYPERTENSION: ICD-10-CM

## 2024-07-31 DIAGNOSIS — Z86.79 HX OF SINUS BRADYCARDIA: ICD-10-CM

## 2024-07-31 DIAGNOSIS — I48.19 PERSISTENT ATRIAL FIBRILLATION (HCC): Primary | ICD-10-CM

## 2024-07-31 DIAGNOSIS — G45.9 TIA (TRANSIENT ISCHEMIC ATTACK): ICD-10-CM

## 2024-07-31 PROCEDURE — 3077F SYST BP >= 140 MM HG: CPT | Performed by: INTERNAL MEDICINE

## 2024-07-31 PROCEDURE — 1123F ACP DISCUSS/DSCN MKR DOCD: CPT | Performed by: INTERNAL MEDICINE

## 2024-07-31 PROCEDURE — 99214 OFFICE O/P EST MOD 30 MIN: CPT | Performed by: INTERNAL MEDICINE

## 2024-07-31 PROCEDURE — 93000 ELECTROCARDIOGRAM COMPLETE: CPT | Performed by: INTERNAL MEDICINE

## 2024-07-31 PROCEDURE — 3080F DIAST BP >= 90 MM HG: CPT | Performed by: INTERNAL MEDICINE

## 2024-07-31 RX ORDER — CHLORTHALIDONE 25 MG/1
12.5 TABLET ORAL DAILY
Qty: 45 TABLET | Refills: 3 | Status: SHIPPED | OUTPATIENT
Start: 2024-07-31

## 2024-10-28 ENCOUNTER — TELEPHONE (OUTPATIENT)
Age: 74
End: 2024-10-28

## 2024-10-28 DIAGNOSIS — I10 PRIMARY HYPERTENSION: Primary | ICD-10-CM

## 2024-10-28 RX ORDER — CHLORTHALIDONE 25 MG/1
25 TABLET ORAL DAILY
Qty: 90 TABLET | Refills: 3 | Status: CANCELLED | OUTPATIENT
Start: 2024-10-28

## 2024-10-28 RX ORDER — CHLORTHALIDONE 25 MG/1
25 TABLET ORAL DAILY
Qty: 90 TABLET | Refills: 3 | Status: SHIPPED | OUTPATIENT
Start: 2024-10-28

## 2024-10-28 NOTE — TELEPHONE ENCOUNTER
Requested Prescriptions     Signed Prescriptions Disp Refills    chlorthalidone (HYGROTON) 25 MG tablet 90 tablet 3     Sig: Take 1 tablet by mouth daily     Authorizing Provider: EKATERINA CALDERON     Ordering User: NIDIA KEBEDE

## 2024-10-28 NOTE — TELEPHONE ENCOUNTER
Increase chlorthalidone to 25 mg daily.  Continue low-sodium diet.  Stay well-hydrated minimize alcohol and caffeine.  Check BMP and magnesium in 7 to 10 days.  Call me in a week or so with update on resting afternoon blood pressures as well

## 2024-12-12 ENCOUNTER — TELEPHONE (OUTPATIENT)
Age: 74
End: 2024-12-12

## 2024-12-12 NOTE — TELEPHONE ENCOUNTER
MEDICATION REFILL REQUEST      Name of Medication:  chlorthalidone (HYGROTON) 25 MG tablet   Dose:    Frequency:    Quantity:  3  Days' supply:  90      Pharmacy Name/Location:      [facility]  #[id number]  [address] [place]     Dose has been increased from 1/2 tablet to 1 full tab. Dr Salgado asked that the 90 day supply be comprised of a full 90 tabs.    Please call and advise if need be.

## 2024-12-27 NOTE — PROGRESS NOTES
Call for Rx for Lexapro, 10mg. Using [facility] scrips now. Call if issues.   Pt arrived from ED, AOx4, able to walk from stretcher to ICU bed. No gait disturbance noted, Pt stating that his symptoms have completely resolved. Hypertensive at this time requiring Cardene gtt. Dual skin assessment completed with Dorene Arzate RN. Skin is dry and intact, no breakdown noted. Bathed with Chlorhexidine per protocol. Wife and son at bedside and updated on current condition and plan of care. Will continue to monitor.

## 2025-01-06 ENCOUNTER — TELEPHONE (OUTPATIENT)
Age: 75
End: 2025-01-06

## 2025-02-01 NOTE — PROGRESS NOTES
[facility] CARDIOLOGY  [address] [place] [address], [address]  [place] [address]  PHONE: [phone]        NAME:  Desmond Salgado  : 1950  MRN: [id number]     PCP:  Karin Bynum MD      SUBJECTIVE:   Desmond Salgado is a 74 y.o. male seen for a follow up visit regarding the following:     Chief Complaint   Patient presents with    Atrial Fibrillation       HPI:    He presented initially to establish [facility] care with a history of 2 to 3 weeks of an abrupt increase in shortness of breath, dyspnea on exertion, fatigue...  He is an avid cyclist and typically gets his heart rate up without evidence for chronotropic incompetence.  However over several weeks he had decreased stamina and increasing dyspnea on his usual rides.  He denies any angina, presyncope or syncope.  He checked an ECG in his office and was noted to be in rate controlled atrial fibrillation.  Eliquis was started.  Typically when he is in sinus, he is bradycardic at 45 to 55 bpm but is clinically asymptomatic.  He had recurrent persistent atrial fibrillation despite successful cardioversion and pharmacotherapy.     Nuclear stress 2021:   Nuclear Findings: There is no evidence of inducible ischemia.     Nuclear Findings: The study is negative for myocardial ischemia. Findings suggest a low risk of myocardial ischemia.     ECG: Resting ECG demonstrates sinus bradycardia.     ECG: Stress ECG was negative for ischemia.    SHER 2021:    Left Ventricle: Left ventricle size is normal. Normal wall thickness. Normal wall motion. The EF by visual approximation is 55 - 60%.     Aortic Valve: Mildly sclerosed cusps.     Mitral Valve: Mild transvalvular regurgitation.     Left Atrium: Left atrium is moderately dilated. Normal appendage flow velocity. No left atrial appendage thrombus noted.  The appendage was thoroughly interrogated with two-dimensional, three-dimensional, and explained imaging.     Right Atrium: Right atrium is

## 2025-02-04 ENCOUNTER — TELEPHONE (OUTPATIENT)
Age: 75
End: 2025-02-04

## 2025-02-04 ENCOUNTER — OFFICE VISIT (OUTPATIENT)
Age: 75
End: 2025-02-04
Payer: COMMERCIAL

## 2025-02-04 VITALS
HEART RATE: 70 BPM | SYSTOLIC BLOOD PRESSURE: 124 MMHG | HEIGHT: 67 IN | WEIGHT: 159 LBS | BODY MASS INDEX: 24.96 KG/M2 | DIASTOLIC BLOOD PRESSURE: 76 MMHG

## 2025-02-04 DIAGNOSIS — I10 PRIMARY HYPERTENSION: Primary | ICD-10-CM

## 2025-02-04 DIAGNOSIS — I10 PRIMARY HYPERTENSION: ICD-10-CM

## 2025-02-04 DIAGNOSIS — I48.19 PERSISTENT ATRIAL FIBRILLATION (HCC): ICD-10-CM

## 2025-02-04 DIAGNOSIS — I48.19 PERSISTENT ATRIAL FIBRILLATION (HCC): Primary | ICD-10-CM

## 2025-02-04 DIAGNOSIS — G45.9 TIA (TRANSIENT ISCHEMIC ATTACK): ICD-10-CM

## 2025-02-04 LAB
ANION GAP SERPL CALC-SCNC: 12 MMOL/L (ref 7–16)
BUN SERPL-MCNC: 39 MG/DL (ref 8–23)
CALCIUM SERPL-MCNC: 9.4 MG/DL (ref 8.8–10.2)
CHLORIDE SERPL-SCNC: 97 MMOL/L (ref 98–107)
CO2 SERPL-SCNC: 32 MMOL/L (ref 20–29)
CREAT SERPL-MCNC: 2.03 MG/DL (ref 0.8–1.3)
GLUCOSE SERPL-MCNC: 106 MG/DL (ref 70–99)
MAGNESIUM SERPL-MCNC: 2.1 MG/DL (ref 1.8–2.4)
POTASSIUM SERPL-SCNC: 3.1 MMOL/L (ref 3.5–5.1)
SODIUM SERPL-SCNC: 140 MMOL/L (ref 136–145)

## 2025-02-04 PROCEDURE — 1123F ACP DISCUSS/DSCN MKR DOCD: CPT | Performed by: INTERNAL MEDICINE

## 2025-02-04 PROCEDURE — 3074F SYST BP LT 130 MM HG: CPT | Performed by: INTERNAL MEDICINE

## 2025-02-04 PROCEDURE — 1126F AMNT PAIN NOTED NONE PRSNT: CPT | Performed by: INTERNAL MEDICINE

## 2025-02-04 PROCEDURE — 3078F DIAST BP <80 MM HG: CPT | Performed by: INTERNAL MEDICINE

## 2025-02-04 PROCEDURE — 93000 ELECTROCARDIOGRAM COMPLETE: CPT | Performed by: INTERNAL MEDICINE

## 2025-02-04 PROCEDURE — 1159F MED LIST DOCD IN RCRD: CPT | Performed by: INTERNAL MEDICINE

## 2025-02-04 PROCEDURE — 99214 OFFICE O/P EST MOD 30 MIN: CPT | Performed by: INTERNAL MEDICINE

## 2025-02-04 PROCEDURE — 1160F RVW MEDS BY RX/DR IN RCRD: CPT | Performed by: INTERNAL MEDICINE

## 2025-02-04 RX ORDER — POTASSIUM CHLORIDE 1500 MG/1
20 TABLET, EXTENDED RELEASE ORAL DAILY
Qty: 90 TABLET | Refills: 3 | Status: SHIPPED | OUTPATIENT
Start: 2025-02-04

## 2025-02-04 NOTE — TELEPHONE ENCOUNTER
----- Message from Dr. Loyd Mora MD sent at 2/4/2025  1:36 PM EST -----  Potassium is low and creatinine is high.  I think he is getting dehydrated with the chlorthalidone.  Hold lisinopril 24 hours  Stop chlorthalidone completely  Hydrate aggressively over the next 4 to 5 days  Give potassium chloride 20 mill equivalents, take twice daily for 2 days in a row and then once daily thereafter  Check another basic metabolic panel on Monday  ----- Message -----  From: Cindy Hinojosa Incoming [place] [employer]  Sent: 2/4/2025   1:34 PM EST  To: Loyd Mora MD

## 2025-02-04 NOTE — TELEPHONE ENCOUNTER
Spoke to patient and reviewed results. All questions and concerns addressed.   Lab order placed      Requested Prescriptions     Pending Prescriptions Disp Refills    potassium chloride (KLOR-CON M) 20 MEQ extended release tablet 90 tablet 3     Sig: Take 1 tablet by mouth daily

## 2025-02-04 NOTE — TELEPHONE ENCOUNTER
Pt.would like to know if his potassium prescription has been sent in.    Pt.notified med sent into [facility] and receipt confirmed by pharmacy.

## 2025-02-07 ENCOUNTER — TELEPHONE (OUTPATIENT)
Age: 75
End: 2025-02-07

## 2025-02-07 NOTE — TELEPHONE ENCOUNTER
Low to moderate risk.  Okay to hold Eliquis 48 to 72 hours prior to the procedure.  Resume postoperatively at the discretion of operating physician.  Continue other cardiac meds through the procedure uninterrupted.

## 2025-02-07 NOTE — TELEPHONE ENCOUNTER
Dr. Staley from [facility] calling to discuss doing a CT guided needle biopsy on this pt. Concerns on anticoagulants. Please call her personal cell. [phone]

## 2025-02-11 DIAGNOSIS — R19.09 ABDOMINAL MASS OF OTHER SITE: ICD-10-CM

## 2025-02-11 DIAGNOSIS — C81.90 HODGKIN LYMPHOMA, UNSPECIFIED HODGKIN LYMPHOMA TYPE, UNSPECIFIED BODY REGION (HCC): ICD-10-CM

## 2025-02-11 DIAGNOSIS — R19.09 OTHER INTRA-ABDOMINAL AND PELVIC SWELLING, MASS AND LUMP: Primary | ICD-10-CM

## 2025-03-25 ENCOUNTER — TELEPHONE (OUTPATIENT)
Age: 75
End: 2025-03-25

## 2025-03-25 NOTE — TELEPHONE ENCOUNTER
Low to moderate risk.  Okay to hold Eliquis 2 to 3 days prior to the procedure.  Continue other meds through the procedure.  Use IV beta-blocker as needed perioperatively and postoperatively to control heart rate and blood pressure.  Resume Eliquis postoperatively at the discretion of operating physician.

## 2025-03-25 NOTE — TELEPHONE ENCOUNTER
Last OV 02/04/25    ASSESSMENT and PLAN     Diagnoses and all orders for this visit:      1. Paroxysmal atrial fibrillation (HCC)- s/p successful A-fib ablation with Dr. Grant July 2022.  Still having intermittent short burst of atrial fibrillation but lasting minutes and continues medications without issues.  Continue Eliquis without interruption.  Avoiding rate slowing meds given propensity for sinus bradycardia in the 40s to 50s when it is in sinus rhythm.  Continue healthy diet exercise regimen as tolerated.       2. 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  No symptoms of chronotropic incompetence.  Monitor clinically for now.  Avoid any rate slowing meds if possible.       3. TIA (transient ischemic attack)-continue Eliquis and blood pressure meds with no recurrence after initiation of therapy       4. Hypertension- continue current doses of Norvasc and lisinopril and minimize sodium. Check BMP and Mg today, replete as needed.  Continue diuretic for now.        5. MCGUIRE (dyspnea on exertion)-likely due to loss of atrial kick with atrial fib.  Completely resolved now.  Follow clinically.  Call immediately with any recurrence, assess for recurrence of A-fib at that point.       6. Chronic fatigue-likely due to loss of atrial kick with A. fib.  Resolved now.  Follow clinically.  As above.

## 2025-03-25 NOTE — TELEPHONE ENCOUNTER
Cardiac Clearance        Physician or Practice Requesting:[facility]  : anam  Contact Phone Number: [phone]  Fax Number: [phone]  Date of Surgery/Procedure: TBD  Type of Surgery or Procedure: Rt nephrectomy  Type of Anesthesia: general  Type of Clearance Requested: Medication Hold Only  Medication to Hold:Eliquis  Days to Hold: 3 days

## (undated) DEVICE — CATHETER US GE COMPATIBILITY SOUNDSTAR ECO 10FR

## (undated) DEVICE — PINNACLE TIF INTRODUCER SHEATH: Brand: PINNACLE

## (undated) DEVICE — CATHETER MAP 7FR L115CM 2-6-2 SPC D CRV 22 ELECTRD PENTARAY

## (undated) DEVICE — SPONGE: SPECIALTY PEANUT XR 100/CS: Brand: MEDICAL ACTION INDUSTRIES

## (undated) DEVICE — PINNACLE INTRODUCER SHEATH: Brand: PINNACLE

## (undated) DEVICE — TUBING PMP FOR CARTO SYS SMARTABLATE

## (undated) DEVICE — PENROSE DRAIN 12" X 1/4: Brand: CARDINAL HEALTH

## (undated) DEVICE — PRESSURE MONITORING SET: Brand: TRUWAVE

## (undated) DEVICE — SYR LR LCK 1ML GRAD NSAF 30ML --

## (undated) DEVICE — GARMENT,MEDLINE,DVT,INT,CALF,MED, GEN2: Brand: MEDLINE

## (undated) DEVICE — BUTTON SWITCH PENCIL BLADE ELECTRODE, HOLSTER: Brand: EDGE

## (undated) DEVICE — MASTISOL ADHESIVE LIQ 2/3ML

## (undated) DEVICE — Device: Brand: NRG TRANSSEPTAL NEEDLE

## (undated) DEVICE — 3M™ TEGADERM™ TRANSPARENT FILM DRESSING FRAME STYLE, 1624W, 2-3/8 IN X 2-3/4 IN (6 CM X 7 CM), 100/CT 4CT/CASE: Brand: 3M™ TEGADERM™

## (undated) DEVICE — REM POLYHESIVE ADULT PATIENT RETURN ELECTRODE: Brand: VALLEYLAB

## (undated) DEVICE — SUTURE VCRL SZ 3-0 L27IN ABSRB UD L26MM SH 1/2 CIR J416H

## (undated) DEVICE — MINOR SPLIT GENERAL: Brand: MEDLINE INDUSTRIES, INC.

## (undated) DEVICE — SYSTEM CLOSURE 6-12 FR VEN VASC VASCADE MVP

## (undated) DEVICE — SUT PROL 2-0 30IN CT2 BLU --

## (undated) DEVICE — STRIP,CLOSURE,WOUND,MEDI-STRIP,1/2X4: Brand: MEDLINE

## (undated) DEVICE — SUTURE VCRL SZ 2-0 L27IN ABSRB UD L26MM CT-2 1/2 CIR J269H

## (undated) DEVICE — CATHETER ABLAT 8FR L115CM 1-6-2MM SPC TIP 3.5MM DF CRV

## (undated) DEVICE — CATHETER EP 7FR L115CM 2-8-2MM SPC TIP 2MM 10 ELECTRD D CRV

## (undated) DEVICE — CATHETER EP 7FR L115CM 2-8-2MM SPC TIP 2MM 10 ELECTRD FJ

## (undated) DEVICE — 18G NG KIT WITH 96IN PROBE COVER (10 PK): Brand: SITE-RITE

## (undated) DEVICE — SUTURE PERMAHAND SZ 2-0 L12X18IN NONABSORBABLE BLK SILK A185H

## (undated) DEVICE — NEEDLE HYPO 21GA L1.5IN GRN POLYPR HUB S STL REG BVL STR

## (undated) DEVICE — SOLUTION IV 1000ML 0.9% SOD CHL

## (undated) DEVICE — PATCH REF EXT FOR CARTO 3 SYS (EA = 6 PACKS)

## (undated) DEVICE — SUTURE VCRL SZ 4-0 L27IN ABSRB UD L19MM PS-2 3/8 CIR PRIM J426H

## (undated) DEVICE — INTRODUCER SHTH CARDIAGUIDE FCL20100

## (undated) DEVICE — SHEET, T, LAPAROTOMY, STERILE: Brand: MEDLINE

## (undated) DEVICE — 3M™ TEGADERM™ TRANSPARENT FILM DRESSING FRAME STYLE, 1626W, 4 IN X 4-3/4 IN (10 CM X 12 CM), 50/CT 4CT/CASE: Brand: 3M™ TEGADERM™

## (undated) DEVICE — PREP SKN CHLRAPRP APL 26ML STR --